# Patient Record
Sex: FEMALE | Race: WHITE | Employment: STUDENT | URBAN - METROPOLITAN AREA
[De-identification: names, ages, dates, MRNs, and addresses within clinical notes are randomized per-mention and may not be internally consistent; named-entity substitution may affect disease eponyms.]

---
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
## 2025-01-05 NOTE — PROGRESS NOTES
Called and spoke with mother. Explained that patients blood pressure was slightly elevated at visit on Monday. Dr. Brock would like patient to check BP at home. Mother confirmed she has a BP machine at home. Asked mother to do once a day for the next 2 days and let us know. Patient should be in a calm environment and sitting for 5 minutes prior to taking. Mother agrees. Also discussed with mother that Dr. Meyer was concerned in July about patients BP and family was instructed to do once daily BP for 2 weeks and follow up. Mother reports that they did not follow up with this. Explained the importance and encouraged mother to contact PCP for additional follow up. Mother agrees. Mother will also let clinic know Friday what BP's are at home.  Robina Dupont RN    
Carmen Brock MD Sigfrid-Fournier, Hilary, LUIZ Quarles,   This kid has had 2 recent measurements of very high BP.  I did not realize it today until she left clinic.  It looks like she may already have a BP machine at home.  Can you call mom and ask them to check her BP and let us know what it is at home?  If she does not have a machine at home, can you ask her to come in for a nurse visit when it is convenient?   Thank you    
See MyChart  Robina Dupont, RN    
Cardiac Monitor/Defib/ACLS/Rescue Kit/O2/BVM/oxygen/pulse ox

## 2025-01-07 ENCOUNTER — OFFICE VISIT (OUTPATIENT)
Dept: PSYCHIATRY | Facility: CLINIC | Age: 20
End: 2025-01-07
Payer: COMMERCIAL

## 2025-01-07 VITALS
HEIGHT: 70 IN | BODY MASS INDEX: 32.81 KG/M2 | WEIGHT: 229.2 LBS | SYSTOLIC BLOOD PRESSURE: 127 MMHG | DIASTOLIC BLOOD PRESSURE: 88 MMHG | HEART RATE: 103 BPM

## 2025-01-07 DIAGNOSIS — F41.0 GENERALIZED ANXIETY DISORDER WITH PANIC ATTACKS: Primary | ICD-10-CM

## 2025-01-07 DIAGNOSIS — F90.0 ATTENTION DEFICIT HYPERACTIVITY DISORDER, INATTENTIVE TYPE: ICD-10-CM

## 2025-01-07 DIAGNOSIS — F41.1 GENERALIZED ANXIETY DISORDER WITH PANIC ATTACKS: Primary | ICD-10-CM

## 2025-01-07 DIAGNOSIS — F41.0 PANIC ATTACK: ICD-10-CM

## 2025-01-07 RX ORDER — DEXMETHYLPHENIDATE HYDROCHLORIDE 5 MG/1
5 CAPSULE, EXTENDED RELEASE ORAL DAILY
Qty: 30 CAPSULE | Refills: 0 | Status: SHIPPED | OUTPATIENT
Start: 2025-01-07

## 2025-01-07 RX ORDER — METHYLPHENIDATE HYDROCHLORIDE 5 MG/1
5-10 TABLET ORAL DAILY PRN
Qty: 60 TABLET | Refills: 0 | Status: SHIPPED | OUTPATIENT
Start: 2025-01-07

## 2025-01-07 RX ORDER — METHYLPHENIDATE HYDROCHLORIDE 40 MG/1
40 CAPSULE, EXTENDED RELEASE ORAL EVERY MORNING
Qty: 30 CAPSULE | Refills: 0 | OUTPATIENT
Start: 2025-01-07

## 2025-01-07 RX ORDER — HYDROXYZINE PAMOATE 25 MG/1
25 CAPSULE ORAL 3 TIMES DAILY PRN
Qty: 30 CAPSULE | Refills: 1 | Status: SHIPPED | OUTPATIENT
Start: 2025-01-07

## 2025-01-07 RX ORDER — ESCITALOPRAM OXALATE 20 MG/1
20 TABLET ORAL DAILY
Qty: 90 TABLET | Refills: 0 | Status: SHIPPED | OUTPATIENT
Start: 2025-01-07

## 2025-01-07 NOTE — PROGRESS NOTES
"           [facility]   Progress Note         DATE: 2025      Identifying Information                                                                                                    ID: Olena Gilmore is a 19 year old female  : 2005  MRN: [id number]    Parents: ABE GILMOREREYES DANIEL  PCP: Abe Clark    Initial Evaluation in this clinic:  20    Chief Complaint                                                                                                        Medication Follow up    History of Present Illness                                                                                   Since the last visit:    Olena reports that she is dating a new lucio, Chandler, and it is going well. Reports that she has not had Ritalin LA due to supply/pharmacy issues for about two months. Completed  finals without Ritalin LA and \"it was rough,\" although finished with As and one B. Utilized methylphenidate IR dose some during this time. Mood is stable overall, noting that is has improved since breaking up with her boyfriend. Reports that anxiety is more manageable. Using THC periodically and notes that it helps with the \"emotional\" part of anxiety. SI/SIB is \"so much better.\" No SIB since at least September per Olena. Sleep is stable overall, but prefers to go to bed later and wake up later. Reports that she is scheduled for psychological testing in May through [facility].        Psychiatric & Medical Review of Systems                        A comprehensive review of systems was performed and is negative other than noted in the HPI.    Psychiatric:    ADHD:  has not had ritalin LA for at least 4-6 weeks  Depression: stable   Sleep: Stable  Anxiety:See HPI  PTSD: vivid dreams, past trauma  Panic: using hydroxyzine more frequently, and THC which she finds helpful  Psychosis: Denies  Leonela: None  Behavioral: None  ED: Not assessed today   Substance use: " periodic THC use. Denies other alcohol, nicotine or other substance use since last time.    SI/SIB: None   Other: Skin picking has improved      Medical & Surgical History       Patient Active Problem List   Diagnosis    Seasonal allergic rhinitis    Dry skin    Skin rash    Primary nocturnal enuresis    Nevus    Abdominal bloating    Class 1 obesity    Nocturnal enuresis    Adjustment disorder with anxious mood    Acanthosis nigricans    Ovarian failure    Primary amenorrhea    Constitutional tall stature (H)    Panic attack    Anxiety    Attention deficit hyperactivity disorder, inattentive type    Overweight    ASHLEY (obstructive sleep apnea)    Patellofemoral pain syndrome of both knees    Dyspareunia in female    Pelvic floor dysfunction    Generalized anxiety disorder with panic attacks    Morbid (severe) obesity due to excess calories (H)    Raynaud's disease without gangrene    Postural dizziness with presyncope    Fatigue, unspecified type    Bruising    Skin picking habit         Past Surgical History:   Procedure Laterality Date    ADENOIDECTOMY  8/8/68        Social & Family History                                                                               School: Started 2nd year at [employer]   Peer Relationships: Appropriate.     Family History   Problem Relation Age of Onset    Bipolar Disorder Mother         also schizoaffective disorder, under care of  nghia    Other - See Comments Maternal Uncle         Possible blood clots, Mom thinks it may have to do with running marathons? She is checking into this and will MyChart message        Allergy     Vancomycin    Current Medications     Current Outpatient Medications   Medication Sig Dispense Refill    azithromycin (ZITHROMAX) 250 MG tablet Two tablets first day, then one tablet daily for four days. 6 tablet 0    benzonatate (TESSALON) 100 MG capsule Take 1 capsule (100 mg) by mouth 3 times daily as needed for cough. 20 capsule 0    drospirenone-ethinyl  "estradiol (AGUSTIN) 3-0.02 MG tablet Take 1 tablet by mouth daily. 28 tablet 12    escitalopram (LEXAPRO) 20 MG tablet Take 1 tablet (20 mg) by mouth daily. 90 tablet 0    estradiol (ESTRACE) 2 MG tablet Take 1 tablet by mouth daily.      hydrOXYzine kp (VISTARIL) 25 MG capsule Take 1 capsule (25 mg) by mouth 3 times daily as needed for anxiety. 30 capsule 1    methylphenidate (RITALIN LA) 40 MG 24 hr capsule Take 1 capsule (40 mg) by mouth every morning. 30 capsule 0    methylphenidate (RITALIN LA) 40 MG 24 hr capsule Take 1 capsule (40 mg) by mouth every morning. 30 capsule 0    methylphenidate (RITALIN) 5 MG tablet Take 1-2 tablets (5-10 mg) by mouth daily as needed (ADHD) 60 tablet 0    naproxen sodium (ANAPROX) 220 MG tablet Take 3 tablets (660 mg) by mouth twice a day as needed (headache. Take with food. Limit to 4 days per week.).      prochlorperazine (COMPAZINE) 10 MG tablet Take 0.5-1 tablets (5-10 mg) by mouth every 8 hours as needed for nausea, vomiting or other (headache). 30 tablet 3    propranolol ER (INDERAL LA) 60 MG 24 hr capsule Take 1 capsule (60 mg) by mouth at bedtime 30 capsule 5    ubrogepant (UBRELVY) 100 MG tablet Take 1 tablet (100 mg) by mouth at onset of headache (migraine. May repeat in 2 hours as needed. Max 2 tabs in 24 hours). 16 tablet 11    vitamin B-2 (RIBOFLAVIN) 100 MG TABS tablet Take 2 tablets (200 mg) by mouth twice a day with breakfast and dinner.         Vitals                                                                                                              Last Vitals:  Vitals:    01/07/25 1603   BP: 127/88   BP Location: Right arm   Patient Position: Sitting   Cuff Size: Adult Regular   Pulse: 103   Weight: 104 kg (229 lb 3.2 oz)   Height: 1.784 m (5' 10.25\")         Wt Readings from Last 4 Encounters:   12/20/24 95.3 kg (210 lb) (98%, Z= 2.08)*   11/15/24 98.1 kg (216 lb 3.2 oz) (98%, Z= 2.16)*   11/08/24 102 kg (224 lb 14.4 oz) (99%, Z= 2.27)*   10/03/24 97.5 kg " "(215 lb) (98%, Z= 2.15)*     * Growth percentiles are based on [facility] (Girls, 2-20 Years) data.       Mental Status Exam                                                                                 Alertness: alert  and oriented  Appearance: casual, dressed appropriate to weather, earrings   Behavior/Demeanor: cooperative, pleasant, and calm, with good  eye contact   Speech: normal and regular rate and rhythm  Language: intact and no problems  Psychomotor: fidgety  Mood: \"good\"   Affect: full range and appropriate; was congruent to mood; was congruent to content  Thought Process/Associations: logical and linear and coherent  Thought Content: denies suicidal and violent ideation, no evidence of psychotic thought  Insight: good  Judgment: good  Cognition: grossly intact   Gait and Station: steady, symmetrical, even    Labs and Data     Recent Labs   Lab Test 11/15/24  1445 11/29/21  1644 07/08/21  1510   WBC 6.2   < > 5.5   HGB 12.4   < > 12.8   HCT 37.8   < > 39.8   MCV 88   < > 88      < > 279   ANEU  --   --  3.3    < > = values in this interval not displayed.     Recent Labs   Lab Test 11/15/24  1445 02/20/24  2129 11/18/23  1904      < > 140   POTASSIUM 4.2   < > 3.9   CHLORIDE 105   < > 107   CO2 25   < > 22   GLC 95   < > 109*   JULES 9.6   < > 9.7   MAG  --   --  2.3   BUN 8.3   < > 10.9   CR 0.78   < > 0.92   GFRESTIMATED >90   < > >90   ALBUMIN 4.4   < > 4.6   PROTTOTAL 7.8   < > 7.4   AST 22   < > 16   ALT 19   < > 19   ALKPHOS 66   < > 56   BILITOTAL 0.4   < > 0.3    < > = values in this interval not displayed.     Recent Labs   Lab Test 11/15/24  1445   CHOL 168      HDL 42*   TRIG 87   A1C 5.2     Recent Labs   Lab Test 11/15/24  1445   TSH 1.72   T4 1.18     Formulation                                       Olena is a 19 year old female with diagnoses of Anxiety, Panic and ADHD. Olena has not been able to obtain her Ritalin LA for over a month due to supply problems which has " contributed to some increased anxiety and functional challenges. Today discussed switching to Focalin XR given that Ritalin LA brand name has stopped manufacturing medications. Will plan to follow up in 1 month. Additionally, Olena has a range of physical health challenges in addition to her mental health challenges and an integrative consult may be helpful in providing her some tools and strategies for managing her symptoms and assess her from a more functional/holistic approach. Placed referral order to SHE Escobar at [facility].        Diagnoses & Plan                                                                                            Today we addressed the following problems:  Generalized anxiety disorder:  Continue escitalopram 20mg daily.   Continue propranolol 60mg ER per Neurologist for migraine prophylaxis   Continue therapy     Panic Disorder:   Continue escitalopram 20 mg   Continue hydroxyzine pamoate 25 mg up to three times daily as needed for panic.     Attention Deficit Hyperactivity Disorder, predominately inattentive type:  STOP Ritalin LA 40mg every morning.   START dexmethylphenidate XR 5 mg, increase by 5 mg every few days up to 15 mg daily.   CONTINUE methylphenidate IR 5-10mg in the afternoon as needed     Informed Consent  We discussed the risks and benefits of the medication(s) mentioned above, including precautions, drug interactions and/or potential side effects/adverse reactions. Specific precautions, interactions and side effects discussed included, but were not limited to: orthostatic hypotension, dizziness, GI upset.      The patient and/or guardian verbalized understanding of the risks and consented to treatment with the capacity to do so.  TREATMENT PLAN:    Therapy  Continue therapy as planned.    Other Recommendations  Move your body for at least 30 minutes each day  Eat a variety of foods including protein, fruits, and vegetables  Practice Deep Breathing 1-2 times  daily  Recommend using SAD lamp with 10,000 lux during the day for 20 -30 minutes    Resources  Safety plan reviewed. To the Emergency Department as needed or call after hours crisis line at [phone] or [phone].   National Suicide Prevention Lifeline: 578.954.5253 (TTY: 264.606.7707). Call anytime for help. (www.suicidepreventionlifeline.org)  Mental Health Association (www.mentalhealth.org): 812.735.9858 or 221-612-5109. [place] Crisis Text Line. Text [place] to 695792  Suicide LifeLine Chat: suicideAuthentix.org/chat  National Waka on Mental Illness (www.osmani.org): 220.303.7207 or 607-968-4932.   Gotcha Ninjashart may be used to communicate with your provider, but this is not intended to be used for emergencies.  Follow up with primary care provider as planned or for medical concerns.    Follow up  Schedule an appointment with me in 1 month or sooner as needed      Provider:   Inna Brownlee, DNP, APRN, PMHNP-BC  [facility]    Level of Medical Decision Making:   - At least 1 chronic problem that is not stable  - Engaged in prescription drug management during visit (discussed any medication benefits, side effects, alternatives, etc.)           The longitudinal plan of care for the condition(s) below were addressed during this visit. Due to the added complexity in care, I will continue to support Olena in the subsequent management of this condition(s) and with the ongoing continuity of care of this condition(s).    Problem List Items Addressed This Visit as of 1/7/2025      Panic attack    Attention deficit hyperactivity disorder, inattentive type    Generalized anxiety disorder with panic attacks - Primary

## 2025-01-07 NOTE — PATIENT INSTRUCTIONS
**For crisis resources, please see the information at the end of this document**   Patient Education    Thank you for coming to the [facility].    Lab Testing:  If you had lab testing today and your results are reassuring or normal they will be mailed to you or sent through MessageParty within 7 days. If the lab tests need quick action we will call you with the results. The phone number we will call with results is # [phone] (home) [phone] (work). If this is not the best number please call our clinic and change the number.    Medication Refills:  If you need any refills please call your pharmacy and they will contact us. Our fax number for refills is [phone]. Please allow three business for refill processing. If you need to  your refill at a new pharmacy, please contact the new pharmacy directly. The new pharmacy will help you get your medications transferred.     Scheduling:  If you have any concerns about today's visit or wish to schedule another appointment please call our office during normal business hours [phone] (8-5:00 M-F)    Contact Us:  Please call [phone] during business hours (8-5:00 M-F).  If after clinic hours, or on the weekend, please call  [phone].    Financial Assistance [phone]  [facility] Billing [phone]  Central Billing Office, [facility]: [phone]  [facility] Billing [phone]  Medical Records [phone]  [facility] Patient Bill of Rights [email or web address]        MENTAL HEALTH CRISIS RESOURCES:  For a emergency help, please call 911 or go to the nearest Emergency Department.      Children's Emergency Walk-In Options:   [facility]:  [address] [place] [address], [address]  [facility]:   [address], [address]  [place] - [address],  [place], [address]    Adult Emergency Walk-In Options:  [facility]:  [address], [place], [address]  [facility]:  [address] [place] [address]  [facility]:  [address], [place] [address]  [facility] :  [address] [place] [address]    [facility] Crisis Information:   [place] (ARINA) - Adult: [phone]       Child: [phone]  [place] - Adult: [phone]     Child: [phone]  [place]: [phone]  [place]: [phone]  [place]: [phone]    List of all [facility] resources:   https://[place].gov/dhs/people-we-serve/adults/health-care/mental-health/resources/crisis-contacts.jsp     National Crisis Information:   Call or text: '988'  National Suicide Prevention Lifeline: 0-096-101-TALK (1-233.295.6501) - for online chat options, visit https://suicidepreventionlifeline.org/chat/  Poison Control Center: 3-653-907-5413  Trans Lifeline: 1-356-407-8568 - Hotline for transgender people of all ages  The Luca Project: 5-447-468-2435 - Hotline for LGBT youth      For Non-Emergency Support:   Fast Tracker: Mental Health & Substance Use Disorder Resources -   https://www.fasttrackNeighbortree.comn.org/        Again thank you for choosing [facility] and please let us know how we can best partner with you to improve you and your family's health.    You may be receiving a survey regarding this appointment. We would love to have your feedback, both positive and negative. The survey is done by an external company, so your answers are anonymous.        **For crisis resources, please see the information at the end of this document**   Patient Education    Thank you for coming to the [facility].    Lab Testing:  If you had lab testing today and your results are reassuring or normal they will be mailed to you or sent through Niche within 7 days. If the lab tests need  quick action we will call you with the results. The phone number we will call with results is # [phone] (home) [phone] (work). If this is not the best number please call our clinic and change the number.    Medication Refills:  If you need any refills please call your pharmacy and they will contact us. Our fax number for refills is [phone]. Please allow three business for refill processing. If you need to  your refill at a new pharmacy, please contact the new pharmacy directly. The new pharmacy will help you get your medications transferred.     Scheduling:  If you have any concerns about today's visit or wish to schedule another appointment please call our office during normal business hours [phone] (8-5:00 M-F)    Contact Us:  Please call [phone] during business hours (8-5:00 M-F).  If after clinic hours, or on the weekend, please call  [phone].    Financial Assistance [phone]  [email or web address] Billing [phone]  Central Billing Office, [facility]: [phone]  [facility] Billing [phone]  Medical Records [phone]  [facility] Patient Bill of Rights [email or web address]        MENTAL HEALTH CRISIS RESOURCES:  For a emergency help, please call 911 or go to the nearest Emergency Department.      Children's Emergency Walk-In Options:   [facility]:  [address] [place], [place], [address]  [facility]:   [address], [address]  [place] - [address], [place], [address]    Adult Emergency Walk-In Options:  [facility]:  [address] [place], [address]  [facility]:  [address], [place] [address]  [facility]:  [address] [place] [address]  [facility] :  [address] [place] [address]    [facility]  Information:   Eligio GrantARINA) - Adult: [phone]       Child: [phone]  [place] - Adult: [phone]     Child: [phone]  [place]: [phone]  [place]: [phone]  [place]: [phone]    List of all [facility] resources:   https://[place].gov/dhs/people-we-serve/adults/health-care/mental-health/resources/crisis-contacts.jsp     National Crisis Information:   Call or text: '988'  National Suicide Prevention Lifeline: 7-496-806-TALK (1-169.968.1011) - for online chat options, visit https://suicidepreventionlifeline.org/chat/  Poison Control Center: 7-876-484-9857  Trans Lifeline: 1-504.180.9338 - Hotline for transgender people of all ages  The Luca Project: 1-723.524.8641 - Hotline for LGBT youth      For Non-Emergency Support:   Fast Tracker: Mental Health & Substance Use Disorder Resources -   https://www.fasttrackermn.org/        Again thank you for choosing [facility] and please let us know how we can best partner with you to improve you and your family's health.    You may be receiving a survey regarding this appointment. We would love to have your feedback, both positive and negative. The survey is done by an external company, so your answers are anonymous.

## 2025-01-07 NOTE — NURSING NOTE
"Chief Complaint   Patient presents with    Eval/Assessment       /88 (BP Location: Right arm, Patient Position: Sitting, Cuff Size: Adult Regular)   Pulse 103   Ht 5' 10.25\" (178.4 cm)   Wt 229 lb 3.2 oz (104 kg)   BMI 32.65 kg/m      Michael Adler  January 7, 2025   "

## 2025-01-07 NOTE — TELEPHONE ENCOUNTER
Inna Brownlee, Chaya Barnett, RN  Phone Number: [phone]     OK to send!        Follow up:  Letter written and sent to patient via [employer].     Ritalin La 40 Mg Capsule last filled 10/21 per .

## 2025-01-20 ENCOUNTER — HOSPITAL ENCOUNTER (EMERGENCY)
Facility: CLINIC | Age: 20
Discharge: HOME | End: 2025-01-20
Attending: EMERGENCY MEDICINE | Admitting: EMERGENCY MEDICINE
Payer: COMMERCIAL

## 2025-01-20 VITALS
TEMPERATURE: 98.5 F | RESPIRATION RATE: 16 BRPM | SYSTOLIC BLOOD PRESSURE: 137 MMHG | DIASTOLIC BLOOD PRESSURE: 90 MMHG | OXYGEN SATURATION: 97 % | HEART RATE: 89 BPM

## 2025-01-20 DIAGNOSIS — R11.0 NAUSEA: ICD-10-CM

## 2025-01-20 DIAGNOSIS — R42 DIZZINESS: ICD-10-CM

## 2025-01-20 LAB
ALBUMIN SERPL BCG-MCNC: 4.8 G/DL (ref 3.5–5.2)
ALBUMIN UR-MCNC: 50 MG/DL
ALP SERPL-CCNC: 70 U/L (ref 40–150)
ALT SERPL W P-5'-P-CCNC: 15 U/L (ref 0–50)
ANION GAP SERPL CALCULATED.3IONS-SCNC: 14 MMOL/L (ref 7–15)
APPEARANCE UR: ABNORMAL
AST SERPL W P-5'-P-CCNC: 21 U/L (ref 0–35)
BASOPHILS # BLD AUTO: 0.1 10E3/UL (ref 0–0.2)
BASOPHILS NFR BLD AUTO: 1 %
BILIRUB SERPL-MCNC: 0.7 MG/DL
BILIRUB UR QL STRIP: NEGATIVE
BUN SERPL-MCNC: 14.4 MG/DL (ref 6–20)
CALCIUM SERPL-MCNC: 10.1 MG/DL (ref 8.8–10.4)
CHLORIDE SERPL-SCNC: 106 MMOL/L (ref 98–107)
COLOR UR AUTO: ABNORMAL
CREAT SERPL-MCNC: 0.84 MG/DL (ref 0.51–0.95)
EGFRCR SERPLBLD CKD-EPI 2021: >90 ML/MIN/1.73M2
EOSINOPHIL # BLD AUTO: 0 10E3/UL (ref 0–0.7)
EOSINOPHIL NFR BLD AUTO: 1 %
ERYTHROCYTE [DISTWIDTH] IN BLOOD BY AUTOMATED COUNT: 13.1 % (ref 10–15)
GLUCOSE SERPL-MCNC: 84 MG/DL (ref 70–99)
GLUCOSE UR STRIP-MCNC: NEGATIVE MG/DL
HCG UR QL: NEGATIVE
HCO3 SERPL-SCNC: 20 MMOL/L (ref 22–29)
HCT VFR BLD AUTO: 40.7 % (ref 35–47)
HGB BLD-MCNC: 13.2 G/DL (ref 11.7–15.7)
HGB UR QL STRIP: NEGATIVE
IMM GRANULOCYTES # BLD: 0 10E3/UL
IMM GRANULOCYTES NFR BLD: 0 %
INTERNAL QC OK POCT: NORMAL
KETONES UR STRIP-MCNC: ABNORMAL MG/DL
LEUKOCYTE ESTERASE UR QL STRIP: ABNORMAL
LYMPHOCYTES # BLD AUTO: 1.9 10E3/UL (ref 0.8–5.3)
LYMPHOCYTES NFR BLD AUTO: 26 %
MAGNESIUM SERPL-MCNC: 2.3 MG/DL (ref 1.7–2.3)
MCH RBC QN AUTO: 28.1 PG (ref 26.5–33)
MCHC RBC AUTO-ENTMCNC: 32.4 G/DL (ref 31.5–36.5)
MCV RBC AUTO: 87 FL (ref 78–100)
MONOCYTES # BLD AUTO: 0.5 10E3/UL (ref 0–1.3)
MONOCYTES NFR BLD AUTO: 7 %
MUCOUS THREADS #/AREA URNS LPF: PRESENT /LPF
NEUTROPHILS # BLD AUTO: 4.8 10E3/UL (ref 1.6–8.3)
NEUTROPHILS NFR BLD AUTO: 66 %
NITRATE UR QL: NEGATIVE
NRBC # BLD AUTO: 0 10E3/UL
NRBC BLD AUTO-RTO: 0 /100
PH UR STRIP: 6.5 [PH] (ref 5–7)
PLATELET # BLD AUTO: 286 10E3/UL (ref 150–450)
POCT KIT EXPIRATION DATE: NORMAL
POCT KIT LOT NUMBER: NORMAL
POTASSIUM SERPL-SCNC: 4.1 MMOL/L (ref 3.4–5.3)
PROT SERPL-MCNC: 7.8 G/DL (ref 6.4–8.3)
RBC # BLD AUTO: 4.69 10E6/UL (ref 3.8–5.2)
RBC URINE: 2 /HPF
SODIUM SERPL-SCNC: 140 MMOL/L (ref 135–145)
SP GR UR STRIP: 1.03 (ref 1–1.03)
SQUAMOUS EPITHELIAL: 17 /HPF
TRANSITIONAL EPI: <1 /HPF
TSH SERPL DL<=0.005 MIU/L-ACNC: 2.33 UIU/ML (ref 0.5–4.3)
UROBILINOGEN UR STRIP-MCNC: 3 MG/DL
WBC # BLD AUTO: 7.2 10E3/UL (ref 4–11)
WBC URINE: 5 /HPF

## 2025-01-20 PROCEDURE — 84443 ASSAY THYROID STIM HORMONE: CPT | Performed by: EMERGENCY MEDICINE

## 2025-01-20 PROCEDURE — 84450 TRANSFERASE (AST) (SGOT): CPT | Performed by: EMERGENCY MEDICINE

## 2025-01-20 PROCEDURE — 99284 EMERGENCY DEPT VISIT MOD MDM: CPT | Mod: 25 | Performed by: EMERGENCY MEDICINE

## 2025-01-20 PROCEDURE — 99284 EMERGENCY DEPT VISIT MOD MDM: CPT | Performed by: EMERGENCY MEDICINE

## 2025-01-20 PROCEDURE — 81001 URINALYSIS AUTO W/SCOPE: CPT | Performed by: STUDENT IN AN ORGANIZED HEALTH CARE EDUCATION/TRAINING PROGRAM

## 2025-01-20 PROCEDURE — 258N000003 HC RX IP 258 OP 636: Performed by: EMERGENCY MEDICINE

## 2025-01-20 PROCEDURE — 80048 BASIC METABOLIC PNL TOTAL CA: CPT | Performed by: EMERGENCY MEDICINE

## 2025-01-20 PROCEDURE — 96375 TX/PRO/DX INJ NEW DRUG ADDON: CPT | Performed by: EMERGENCY MEDICINE

## 2025-01-20 PROCEDURE — 85004 AUTOMATED DIFF WBC COUNT: CPT | Performed by: EMERGENCY MEDICINE

## 2025-01-20 PROCEDURE — 85048 AUTOMATED LEUKOCYTE COUNT: CPT | Performed by: EMERGENCY MEDICINE

## 2025-01-20 PROCEDURE — 96361 HYDRATE IV INFUSION ADD-ON: CPT | Performed by: EMERGENCY MEDICINE

## 2025-01-20 PROCEDURE — 36415 COLL VENOUS BLD VENIPUNCTURE: CPT | Performed by: EMERGENCY MEDICINE

## 2025-01-20 PROCEDURE — 96374 THER/PROPH/DIAG INJ IV PUSH: CPT | Performed by: EMERGENCY MEDICINE

## 2025-01-20 PROCEDURE — 83735 ASSAY OF MAGNESIUM: CPT | Performed by: EMERGENCY MEDICINE

## 2025-01-20 PROCEDURE — 250N000011 HC RX IP 250 OP 636: Performed by: EMERGENCY MEDICINE

## 2025-01-20 RX ORDER — METOCLOPRAMIDE HYDROCHLORIDE 5 MG/ML
5 INJECTION INTRAMUSCULAR; INTRAVENOUS ONCE
Status: COMPLETED | OUTPATIENT
Start: 2025-01-20 | End: 2025-01-20

## 2025-01-20 RX ORDER — KETOROLAC TROMETHAMINE 15 MG/ML
15 INJECTION, SOLUTION INTRAMUSCULAR; INTRAVENOUS ONCE
Status: COMPLETED | OUTPATIENT
Start: 2025-01-20 | End: 2025-01-20

## 2025-01-20 RX ORDER — DIPHENHYDRAMINE HYDROCHLORIDE 50 MG/ML
25 INJECTION INTRAMUSCULAR; INTRAVENOUS ONCE
Status: COMPLETED | OUTPATIENT
Start: 2025-01-20 | End: 2025-01-20

## 2025-01-20 RX ORDER — ONDANSETRON 4 MG/1
4 TABLET, ORALLY DISINTEGRATING ORAL EVERY 8 HOURS PRN
Qty: 10 TABLET | Refills: 0 | Status: SHIPPED | OUTPATIENT
Start: 2025-01-20 | End: 2025-01-23

## 2025-01-20 RX ADMIN — KETOROLAC TROMETHAMINE 15 MG: 15 INJECTION, SOLUTION INTRAMUSCULAR; INTRAVENOUS at 20:26

## 2025-01-20 RX ADMIN — SODIUM CHLORIDE 1000 ML: 9 INJECTION, SOLUTION INTRAVENOUS at 20:23

## 2025-01-20 RX ADMIN — METOCLOPRAMIDE 5 MG: 5 INJECTION, SOLUTION INTRAMUSCULAR; INTRAVENOUS at 20:23

## 2025-01-20 ASSESSMENT — ACTIVITIES OF DAILY LIVING (ADL)
ADLS_ACUITY_SCORE: 41

## 2025-01-20 NOTE — ED PROVIDER NOTES
[place] [facility]ERGENCY DE[facility]MENT ([facility])    1/20/25       ED PROVIDER NOTE       History     Chief Complaint   Patient presents with    Dizziness    Nausea     HPI  Olena Gilmore is a 19 year old female with a past medical history of JULIA, primary ovarian failure, migraines who presents to the Emergency Department with dizziness and nausea. The patient states that she he has had migraine aura and nausea for the past week. She notes that she has had migraine aura before but it has not lasted this long. In the past she has used over the counter remedies. She does not know the cause of her migraine aura.she states she feels like she is going to pass out. She gets nauseous every time she eats. She also notes that she has had muscle weakness for the past week. Last night she developed muscle spasm. No other symptoms noted.    Past Medical History  Past Medical History:   Diagnosis Date    GERD (gastroesophageal reflux disease)     Obesity, pediatric, BMI 85th to less than 95th percentile for age 9/18/2017     Past Surgical History:   Procedure Laterality Date    ADENOIDECTOMY  8/8/68     azithromycin (ZITHROMAX) 250 MG tablet  benzonatate (TESSALON) 100 MG capsule  dexmethylphenidate (FOCALIN XR) 5 MG 24 hr capsule  drospirenone-ethinyl estradiol (AGUSTIN) 3-0.02 MG tablet  escitalopram (LEXAPRO) 20 MG tablet  estradiol (ESTRACE) 2 MG tablet  hydrOXYzine kp (VISTARIL) 25 MG capsule  methylphenidate (RITALIN) 5 MG tablet  naproxen sodium (ANAPROX) 220 MG tablet  prochlorperazine (COMPAZINE) 10 MG tablet  propranolol ER (INDERAL LA) 60 MG 24 hr capsule  ubrogepant (UBRELVY) 100 MG tablet  vitamin B-2 (RIBOFLAVIN) 100 MG TABS tablet      Allergies   Allergen Reactions    Vancomycin Headache, Itching, Swelling and Unknown     Family History  Family History   Problem Relation Age of Onset    Bipolar Disorder Mother         also schizoaffective disorder, under care of  nghia    Other - See Comments Maternal Uncle          Possible blood clots, Mom thinks it may have to do with running marathons? She is checking into this and will MyChart message     Social History   Social History     Tobacco Use    Smoking status: Never     Passive exposure: Never    Smokeless tobacco: Never    Tobacco comments:     No second hand smoke exposure.    Vaping Use    Vaping status: Never Used   Substance Use Topics    Alcohol use: No    Drug use: No      A medically appropriate review of systems was performed with pertinent positives and negatives noted in the HPI, and all other systems negative.    Physical Exam   BP: 119/84  Pulse: 82  Temp: 98.5  F (36.9  C)  Resp: 20  SpO2: 98 %  Physical Exam  Vitals and nursing note reviewed.   Constitutional:       General: She is not in acute distress.     Appearance: She is well-developed. She is not diaphoretic.   HENT:      Head: Normocephalic and atraumatic.      Mouth/Throat:      Pharynx: No oropharyngeal exudate.   Eyes:      General: No scleral icterus.        Right eye: No discharge.         Left eye: No discharge.      Pupils: Pupils are equal, round, and reactive to light.   Cardiovascular:      Rate and Rhythm: Normal rate and regular rhythm.      Heart sounds: Normal heart sounds. No murmur heard.     No friction rub. No gallop.   Pulmonary:      Effort: Pulmonary effort is normal. No respiratory distress.      Breath sounds: Normal breath sounds. No wheezing.   Chest:      Chest wall: No tenderness.   Abdominal:      General: Bowel sounds are normal. There is no distension.      Palpations: Abdomen is soft.      Tenderness: There is no abdominal tenderness.   Musculoskeletal:         General: No tenderness or deformity. Normal range of motion.      Cervical back: Normal range of motion and neck supple.   Skin:     General: Skin is warm and dry.      Coloration: Skin is not pale.      Findings: No erythema or rash.   Neurological:      Mental Status: She is alert and oriented to person,  place, and time.      Cranial Nerves: No cranial nerve deficit.           ED Course, Procedures, & Data      Procedures                Results for orders placed or performed during the hospital encounter of 01/20/25   hCG qual urine POCT     Status: Normal   Result Value Ref Range    HCG Qual Urine Negative Negative    Internal QC Check POCT Valid Valid    POCT Kit Lot Number [id number]     POCT Kit Expiration Date 2026-05-04      Medications   sodium chloride 0.9% BOLUS 1,000 mL (has no administration in time range)   metoclopramide (REGLAN) injection 5 mg (has no administration in time range)   ketorolac (TORADOL) injection 15 mg (has no administration in time range)   diphenhydrAMINE (BENADRYL) injection 25 mg (has no administration in time range)     Labs Ordered and Resulted from Time of ED Arrival to Time of ED Departure   HCG QUALITATIVE URINE POCT - Normal       Result Value    HCG Qual Urine Negative      Internal QC Check POCT Valid      POCT Kit Lot Number [id number]      POCT Kit Expiration Date 2026-05-04     ROUTINE UA WITH MICROSCOPIC REFLEX TO CULTURE   COMPREHENSIVE METABOLIC PANEL   MAGNESIUM   TSH WITH FREE T4 REFLEX     No orders to display              Assessment & Plan    Is a 19-year-old female who presents with nausea and dizziness.  She states this is similar to previous migraine auras but longer lasting.  No known precipitating factors.  She also notes feeling of muscle weakness and muscle cramping.  Exam demonstrates no acute abnormalities.  Lab work shows no acute abnormalities.  Patient was given IV fluids, ketorolac, and metoclopramide.  She had resolution of symptoms.  I discussed all results with patient.  Will provide prescription for ondansetron if needed.  Will discharge with return precautions.    I have reviewed the nursing notes. I have reviewed the findings, diagnosis, plan and need for follow up with the patient.    New Prescriptions    No medications on file       Final diagnoses:    None   I, Roxann Sosa, am serving as a trained medical scribe to document services personally performed by Deep Crespo DO, based on the provider's statements to me.     I, Deep Crespo DO, was physically present and have reviewed and verified the accuracy of this note documented by Roxann Sosa.     Deep Crespo DO  [facility] EMERGENCY DEPARTMENT  1/20/2025     Deep Crespo DO  01/20/25 4590

## 2025-01-21 ENCOUNTER — PATIENT OUTREACH (OUTPATIENT)
Dept: FAMILY MEDICINE | Facility: CLINIC | Age: 20
End: 2025-01-21
Payer: COMMERCIAL

## 2025-01-21 NOTE — TELEPHONE ENCOUNTER
Needs TCM outreach    Patient Class: Emergency    Diagnosis: Dizziness    Nausea      Cathie Guzman RN

## 2025-01-21 NOTE — TELEPHONE ENCOUNTER
First attempt. Attempted to reach pt to complete hospital follow-up call. There was no answer. Left message to return call to a nurse at [phone].    If pt returns call please complete the following:    Please complete Post Discharge Assessment questionnaire found in Screenings tab.      2.  After completing Post Discharge Assessment questionnaire, please enter the following dot phrase and to complete note (.rnhospedoutreach):      Neo White RN, BSN

## 2025-01-21 NOTE — ED TRIAGE NOTES
Pt ambulatory to triage with CC of dizziness and nausea. Reports this feels like a migraine aura and feels as though she may pass out. This episode has lasted 1 week. Pt reports treating symptoms with water and Liquid IV electrolyte solutions. Has been feeling more nauseaus after eating and seeing spots     Triage Assessment (Adult)       Row Name 01/20/25 1846          Triage Assessment    Airway WDL WDL        Respiratory WDL    Respiratory WDL WDL        Skin Circulation/Temperature WDL    Skin Circulation/Temperature WDL WDL        Cardiac WDL    Cardiac WDL WDL        Peripheral/Neurovascular WDL    Peripheral Neurovascular WDL WDL        Cognitive/Neuro/Behavioral WDL    Cognitive/Neuro/Behavioral WDL X  dizzy        Lex Coma Scale    Best Eye Response 4-->(E4) spontaneous     Best Motor Response 6-->(M6) obeys commands     Best Verbal Response 5-->(V5) oriented     Robards Coma Scale Score 15

## 2025-01-22 NOTE — TELEPHONE ENCOUNTER
Second attempt. Attempted to reach pt to complete hospital follow-up call. There was no answer. Left message to return call to a nurse at [phone].    If pt returns call please complete the following:    Please complete Post Discharge Assessment questionnaire found in Screenings tab.      2.  After completing Post Discharge Assessment questionnaire, please enter the following dot phrase and to complete note (.rnhospedoutreach):       Neo White RN, BSN

## 2025-01-23 ENCOUNTER — ANCILLARY PROCEDURE (OUTPATIENT)
Dept: GENERAL RADIOLOGY | Facility: CLINIC | Age: 20
End: 2025-01-23
Attending: PEDIATRICS
Payer: COMMERCIAL

## 2025-01-23 ENCOUNTER — OFFICE VISIT (OUTPATIENT)
Dept: FAMILY MEDICINE | Facility: CLINIC | Age: 20
End: 2025-01-23
Payer: COMMERCIAL

## 2025-01-23 ENCOUNTER — ANCILLARY PROCEDURE (OUTPATIENT)
Dept: CT IMAGING | Facility: CLINIC | Age: 20
End: 2025-01-23
Attending: PEDIATRICS
Payer: COMMERCIAL

## 2025-01-23 VITALS
TEMPERATURE: 97.6 F | BODY MASS INDEX: 31.78 KG/M2 | HEART RATE: 89 BPM | OXYGEN SATURATION: 99 % | DIASTOLIC BLOOD PRESSURE: 78 MMHG | SYSTOLIC BLOOD PRESSURE: 111 MMHG | HEIGHT: 71 IN | RESPIRATION RATE: 16 BRPM | WEIGHT: 227 LBS

## 2025-01-23 DIAGNOSIS — R11.2 NAUSEA AND VOMITING, UNSPECIFIED VOMITING TYPE: ICD-10-CM

## 2025-01-23 DIAGNOSIS — R11.2 NAUSEA AND VOMITING, UNSPECIFIED VOMITING TYPE: Primary | ICD-10-CM

## 2025-01-23 DIAGNOSIS — R10.13 ABDOMINAL PAIN, EPIGASTRIC: ICD-10-CM

## 2025-01-23 DIAGNOSIS — F33.1 MODERATE EPISODE OF RECURRENT MAJOR DEPRESSIVE DISORDER (H): ICD-10-CM

## 2025-01-23 DIAGNOSIS — S30.851A METAL FOREIGN BODY IN ABDOMEN: ICD-10-CM

## 2025-01-23 DIAGNOSIS — H92.12 OTORRHEA, LEFT: ICD-10-CM

## 2025-01-23 DIAGNOSIS — E66.01 MORBID (SEVERE) OBESITY DUE TO EXCESS CALORIES (H): ICD-10-CM

## 2025-01-23 LAB
ALBUMIN SERPL BCG-MCNC: 4.6 G/DL (ref 3.5–5.2)
ALP SERPL-CCNC: 67 U/L (ref 40–150)
ALT SERPL W P-5'-P-CCNC: 17 U/L (ref 0–50)
ANION GAP SERPL CALCULATED.3IONS-SCNC: 9 MMOL/L (ref 7–15)
AST SERPL W P-5'-P-CCNC: 19 U/L (ref 0–35)
BILIRUB SERPL-MCNC: 0.6 MG/DL
BUN SERPL-MCNC: 14.8 MG/DL (ref 6–20)
C TRACH DNA SPEC QL NAA+PROBE: NEGATIVE
CALCIUM SERPL-MCNC: 10.1 MG/DL (ref 8.8–10.4)
CHLORIDE SERPL-SCNC: 106 MMOL/L (ref 98–107)
CREAT SERPL-MCNC: 1 MG/DL (ref 0.51–0.95)
EGFRCR SERPLBLD CKD-EPI 2021: 83 ML/MIN/1.73M2
GLUCOSE SERPL-MCNC: 94 MG/DL (ref 70–99)
HCO3 SERPL-SCNC: 25 MMOL/L (ref 22–29)
LIPASE SERPL-CCNC: 24 U/L (ref 13–60)
MONOCYTES NFR BLD AUTO: NEGATIVE %
N GONORRHOEA DNA SPEC QL NAA+PROBE: NEGATIVE
POTASSIUM SERPL-SCNC: 4.1 MMOL/L (ref 3.4–5.3)
PROT SERPL-MCNC: 7.4 G/DL (ref 6.4–8.3)
SODIUM SERPL-SCNC: 140 MMOL/L (ref 135–145)
SPECIMEN TYPE: NORMAL
SPECIMEN TYPE: NORMAL

## 2025-01-23 PROCEDURE — 74177 CT ABD & PELVIS W/CONTRAST: CPT | Performed by: RADIOLOGY

## 2025-01-23 RX ORDER — IOPAMIDOL 755 MG/ML
111 INJECTION, SOLUTION INTRAVASCULAR ONCE
Status: COMPLETED | OUTPATIENT
Start: 2025-01-23 | End: 2025-01-23

## 2025-01-23 RX ORDER — OFLOXACIN 3 MG/ML
5 SOLUTION AURICULAR (OTIC) 2 TIMES DAILY
Qty: 10 ML | Refills: 0 | Status: SHIPPED | OUTPATIENT
Start: 2025-01-23

## 2025-01-23 RX ADMIN — IOPAMIDOL 111 ML: 755 INJECTION, SOLUTION INTRAVASCULAR at 14:24

## 2025-01-23 ASSESSMENT — PAIN SCALES - GENERAL: PAINLEVEL_OUTOF10: NO PAIN (0)

## 2025-01-23 NOTE — PROGRESS NOTES
"  Assessment & Plan     Nausea and vomiting, unspecified vomiting type  DDx includes viral gastroenteritis, ear infection, migraine, constipation, etc.    Patient Instructions   -  the anti nausea prescription and start using it  - Try the migraine medication  - Drink lots of liquids with electrolytes such as Gatorade  - Follow-up if anything worsens or if not improving in 2-3 days  - Follow-up in 1 month to recheck left ear  - NEISSERIA GONORRHOEA PCR; Future  - CHLAMYDIA TRACHOMATIS PCR; Future  - XR Abdomen 2 Views; Future  - ofloxacin (FLOXIN) 0.3 % otic solution; Place 5 drops Into the left ear 2 times daily.  - Comprehensive metabolic panel (BMP + Alb, Alk Phos, ALT, AST, Total. Bili, TP); Future  - Mononucleosis screen; Future  - Lipase; Future  - NEISSERIA GONORRHOEA PCR  - CHLAMYDIA TRACHOMATIS PCR  - Comprehensive metabolic panel (BMP + Alb, Alk Phos, ALT, AST, Total. Bili, TP)  - Mononucleosis screen  - Lipase    Moderate episode of recurrent major depressive disorder (H)  Managed by psychiatry    Morbid (severe) obesity due to excess calories (H)  Not addressed    Otorrhea, left  Follow-up in 1 month  - ofloxacin (FLOXIN) 0.3 % otic solution; Place 5 drops Into the left ear 2 times daily.          BMI  Estimated body mass index is 31.66 kg/m  as calculated from the following:    Height as of this encounter: 1.803 m (5' 11\").    Weight as of this encounter: 103 kg (227 lb).             Jaquan Hyatt is a 19 year old, presenting for the following health issues:  Nausea & Vomiting        1/23/2025     9:08 AM   Additional Questions   Roomed by benjamin     History of Present Illness       Reason for visit:  Nausea, vomiting, dizziness  Symptom onset:  3-7 days ago   She is taking medications regularly.         Acute Illness  Acute illness concerns: nausea vomiting   Onset/Duration: 1 week ago   Symptoms:  Fever: No  Chills/Sweats: No  Headache (location?): YES visual aura, headache on and " "off  Sinus Pressure: No  Conjunctivitis:  No  Ear Pain: YES- left, also sides of neck   Rhinorrhea: No  Congestion: No  Sore Throat: No  Cough: no  Wheeze: no   Decreased Appetite: YES  Nausea: YES  Vomiting: YES  Diarrhea: No  Dysuria/Freq.: No  Dysuria or Hematuria: No  Fatigue/Achiness: YES  Sick/Strep Exposure: No  Therapies tried and outcome: None    Patient was seen in ED 3 days ago for dizziness and nausea.  The following labs were unremarkable; urinalysis, UPT, CMP, CBC, TSH, T4, Mg.  She was given IVF, Metoclopramide and Ketorolac and her symptoms improved but have not resolved.  She was prescribed Zofran but has not yet picked it up.    She has now had 1 week of nausea and two episodes of NBNB emesis yesterday.  She feels dizzy, is seeing spots, and had an episode of double vision yesterday.  No travel, no sick contacts.  This doesn't feel like a migraine.  Occasionally uses marijuana.  No vaginal symptoms.  No dysuria.  Having normal BMs.  Recently started Focalin but has tolerated in the past without issues.  Does have L ear pain, a slight headache          Review of Systems  Constitutional, HEENT, cardiovascular, pulmonary, gi and gu systems are negative, except as otherwise noted.      Objective    /78   Pulse 89   Temp 97.6  F (36.4  C) (Temporal)   Resp 16   Ht 1.803 m (5' 11\")   Wt 103 kg (227 lb)   SpO2 99%   BMI 31.66 kg/m    Body mass index is 31.66 kg/m .  Physical Exam   GENERAL: alert and no distress  EYES: Eyes grossly normal to inspection, PERRL and conjunctivae and sclerae normal  HENT: normal cephalic/atraumatic, right ear: normal: no effusions, no erythema, normal landmarks, left ear: purulent drainage in canal, nose and mouth without ulcers or lesions, oropharynx clear, and oral mucous membranes moist  NECK: no adenopathy, no asymmetry, masses, or scars, no nuchal rigidity   RESP: lungs clear to auscultation - no rales, rhonchi or wheezes  CV: regular rate and rhythm, normal " S1 S2, no S3 or S4, no murmur, click or rub, no peripheral edema  ABDOMEN: soft, mildly tender in RLQ, no hepatosplenomegaly, no masses and bowel sounds normal  MS: no gross musculoskeletal defects noted, no edema          The longitudinal plan of care for the diagnosis(es)/condition(s) as documented were addressed during this visit. Due to the added complexity in care, I will continue to support Olena in the subsequent management and with ongoing continuity of care.    Signed Electronically by: Lyndsey Meyer MD

## 2025-01-23 NOTE — PATIENT INSTRUCTIONS
-  the anti nausea prescription and start using it  - Try the migraine medication  - Drink lots of liquids with electrolytes such as Gatorade  - Follow-up if anything worsens or if not improving in 2-3 days  - Follow-up in 1 month to recheck left ear    At [facility], we strive to deliver an exceptional experience to you, every time we see you. If you receive a survey, please let us know what we are doing well and/or what we could improve upon, as we do value your feedback.  If you have MyChart, you can expect to receive results automatically within 24 hours of their completion.  Your provider will send a note interpreting your results as well.   If you do not have MyChart, you should receive your results in about a week by mail.    Your care team:                            Family Medicine Internal Medicine   MD Vladimir Moser, MD Cayetano De León, MD Claudette Zavala, JAYDEN Gregory, MD Pediatrics   Georgina Blackman, MD Denia Murphy, MD Ashley Mondragon, APRN CNP Louise Cardona APRN CNP   Oleg Ratliff, MD Lyndsey Meyer, MD Starr Srivastava, CNP     Rakan Longoria, CNP Same-Day Provider (No follow-up visits)   MARYANN Dolan, DNP JAYDEN Martinez APRN, FNP, BC INGA DunnC     Clinic hours: Monday - Thursday 7 am-6 pm; Fridays 7 am-5 pm.   Urgent care: Monday - Friday 10 am- 8 pm; Saturday and Sunday 9 am-5 pm.    Clinic: [phone]       [facility]: Monday - Thursday 8 am - 7 pm; Friday 8 am - 6 pm  [facility]: [phone]

## 2025-01-23 NOTE — DISCHARGE INSTRUCTIONS

## 2025-01-23 NOTE — LETTER
January 23, 2025      Olena Gilmore  [address] [address] [address]        To Whom It May Concern:    Olena Gilmore  was seen on 1/23/2025.  Please excuse her  until she is feeling better due to illness.        Sincerely,        Lyndsey Meyer MD    Electronically signed

## 2025-01-23 NOTE — RESULT ENCOUNTER NOTE
Dear Olena Gilmore,    Your xray was basically lizbeth except there were some small, rice grain sized radiopaque objects on your abdomen.  Were there any jewels on your clothing in that area?  Was there anything metallic that you might have ingested?  Send me a HOSTING message.    Please don't hesitate to call me or send a message if you have any questions.    Sincerely,  Lyndsey Meyer M.D.  [phone]

## 2025-01-24 NOTE — RESULT ENCOUNTER NOTE
Dear Olena,    The CT scan is normal except for some small metallic densities in the colon that you most likely ingested.  These should not cause any harm and should pass on their own, but I want to get an xray next week to make sure they are gone.  Please call to schedule an xray appt.    Let me know if you have any questions!    Electronically signed by:  Lyndsey Meyer MD

## 2025-01-25 ENCOUNTER — MYC MEDICAL ADVICE (OUTPATIENT)
Dept: PSYCHIATRY | Facility: CLINIC | Age: 20
End: 2025-01-25
Payer: COMMERCIAL

## 2025-01-27 NOTE — TELEPHONE ENCOUNTER
Per most recent visit note:  START dexmethylphenidate XR 5 mg, increase by 5 mg every few days up to 15 mg daily.     Per script sent to pharmacy:  dexmethylphenidate (FOCALIN XR) 5 MG 24 hr capsule 30 capsule 0 1/7/2025 -- No   Sig - Route: Take 1 capsule (5 mg) by mouth daily. - Oral

## 2025-01-31 ENCOUNTER — ANCILLARY PROCEDURE (OUTPATIENT)
Dept: GENERAL RADIOLOGY | Facility: CLINIC | Age: 20
End: 2025-01-31
Attending: PEDIATRICS
Payer: COMMERCIAL

## 2025-01-31 DIAGNOSIS — S30.851A METAL FOREIGN BODY IN ABDOMEN: ICD-10-CM

## 2025-01-31 PROCEDURE — 74019 RADEX ABDOMEN 2 VIEWS: CPT | Mod: TC | Performed by: RADIOLOGY

## 2025-01-31 NOTE — RESULT ENCOUNTER NOTE
Dear Olena Gilmore,    The radio-opaque objets are now gone!  Please don't hesitate to call me or send a message if you have any questions.    Sincerely,  Lyndsey Meyer M.D.  [phone]

## 2025-02-20 ENCOUNTER — OFFICE VISIT (OUTPATIENT)
Dept: PSYCHIATRY | Facility: CLINIC | Age: 20
End: 2025-02-20
Payer: COMMERCIAL

## 2025-02-20 VITALS
WEIGHT: 233.1 LBS | SYSTOLIC BLOOD PRESSURE: 137 MMHG | HEIGHT: 71 IN | DIASTOLIC BLOOD PRESSURE: 82 MMHG | BODY MASS INDEX: 32.63 KG/M2 | HEART RATE: 87 BPM

## 2025-02-20 DIAGNOSIS — F90.0 ATTENTION DEFICIT HYPERACTIVITY DISORDER, INATTENTIVE TYPE: ICD-10-CM

## 2025-02-20 DIAGNOSIS — F41.0 PANIC ATTACK: ICD-10-CM

## 2025-02-20 RX ORDER — HYDROXYZINE PAMOATE 25 MG/1
25 CAPSULE ORAL 3 TIMES DAILY PRN
Qty: 30 CAPSULE | Refills: 2 | Status: SHIPPED | OUTPATIENT
Start: 2025-02-20

## 2025-02-20 RX ORDER — METHYLPHENIDATE HYDROCHLORIDE 5 MG/1
5-10 TABLET ORAL DAILY PRN
Qty: 60 TABLET | Refills: 0 | Status: SHIPPED | OUTPATIENT
Start: 2025-02-20

## 2025-02-20 RX ORDER — DEXMETHYLPHENIDATE HYDROCHLORIDE 15 MG/1
15 CAPSULE, EXTENDED RELEASE ORAL DAILY
Qty: 30 CAPSULE | Refills: 0 | Status: SHIPPED | OUTPATIENT
Start: 2025-04-30

## 2025-02-20 RX ORDER — DEXMETHYLPHENIDATE HYDROCHLORIDE 15 MG/1
15 CAPSULE, EXTENDED RELEASE ORAL DAILY
Qty: 30 CAPSULE | Refills: 0 | Status: SHIPPED | OUTPATIENT
Start: 2025-04-02

## 2025-02-20 RX ORDER — ESCITALOPRAM OXALATE 20 MG/1
20 TABLET ORAL DAILY
Qty: 90 TABLET | Refills: 0 | Status: SHIPPED | OUTPATIENT
Start: 2025-02-20

## 2025-02-20 RX ORDER — DEXMETHYLPHENIDATE HYDROCHLORIDE 15 MG/1
15 CAPSULE, EXTENDED RELEASE ORAL DAILY
Qty: 30 CAPSULE | Refills: 0 | Status: SHIPPED | OUTPATIENT
Start: 2025-02-20

## 2025-02-20 NOTE — PATIENT INSTRUCTIONS
**For crisis resources, please see the information at the end of this document**   Patient Education    Thank you for coming to the [facility].    Lab Testing:  If you had lab testing today and your results are reassuring or normal they will be mailed to you or sent through [employer] within 7 days. If the lab tests need quick action we will call you with the results. The phone number we will call with results is # [phone] (home) [phone] (work). If this is not the best number please call our clinic and change the number.    Medication Refills:  If you need any refills please call your pharmacy and they will contact us. Our fax number for refills is [phone]. Please allow three business for refill processing. If you need to  your refill at a new pharmacy, please contact the new pharmacy directly. The new pharmacy will help you get your medications transferred.     Scheduling:  If you have any concerns about today's visit or wish to schedule another appointment please call our office during normal business hours [phone] (8-5:00 M-F)    Contact Us:  Please call [phone] during business hours (8-5:00 M-F).  If after clinic hours, or on the weekend, please call  [phone].    Financial Assistance [phone]  [facility] Billing [phone]  Central Billing Office, [facility]: [phone]  [facility] Billing [phone]  Medical Records [phone]  [facility] Patient Bill of Rights [email or web address]        MENTAL HEALTH CRISIS RESOURCES:  For a emergency help, please call 911 or go to the nearest Emergency Department.      Children's Emergency Walk-In Options:   [facility]:  [address] [place] [address], [address]  [facility]:   [address], [address]  [place] - [address],  [place], [address]    Adult Emergency Walk-In Options:  [facility]:  [address], [place], [place], [address]  [facility]:  [address] [place] [address]  [facility]:  [address], [place] [address]  [facility] :  [address] [place] [address]    [facility] Crisis Information:   [place]ligio FLOWER) - Adult: [phone]       Child: [phone]  [place] - Adult: [phone]     Child: [phone]  [place]: [phone]  [place]: [phone]  [place]: [phone]    List of all [facility] resources:   https://[place].gov/dhs/people-we-serve/adults/health-care/mental-health/resources/crisis-contacts.jsp     National Crisis Information:   Call or text: '988'  National Suicide Prevention Lifeline: 5-284-683-TALK (1-176.326.9246) - for online chat options, visit https://suicidepreventionlifeline.org/chat/  Poison Control Center: 6-145-124-8381  Trans Lifeline: 3-582-421-6822 - Hotline for transgender people of all ages  The Luca Project: 8-692-049-8903 - Hotline for LGBT youth      For Non-Emergency Support:   Fast Tracker: Mental Health & Substance Use Disorder Resources -   https://www.fasttrackermn.org/        Again thank you for choosing [facility] and please let us know how we can best partner with you to improve you and your family's health.    You may be receiving a survey regarding this appointment. We would love to have your feedback, both positive and negative. The survey is done by an external company, so your answers are anonymous.

## 2025-02-20 NOTE — NURSING NOTE
"Chief Complaint   Patient presents with    Eval/Assessment       /82 (BP Location: Right arm, Patient Position: Sitting, Cuff Size: Adult Regular)   Pulse 87   Ht 5' 10.75\" (179.7 cm)   Wt 233 lb 1.6 oz (105.7 kg)   BMI 32.74 kg/m      Michael Adler  February 20, 2025   "
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
## 2025-03-10 ENCOUNTER — MYC MEDICAL ADVICE (OUTPATIENT)
Dept: PSYCHIATRY | Facility: CLINIC | Age: 20
End: 2025-03-10
Payer: COMMERCIAL

## 2025-03-10 NOTE — TELEPHONE ENCOUNTER
Per :  - Dexmethylphenidate Er 15 Mg Cp last filled 2/11 #30  - Methylphenidate 5 Mg Tablet last filled 2/24 #60    Per chart review, there are available refills on file. Patient notified via Super.

## 2025-03-17 PROBLEM — G90.A POTS (POSTURAL ORTHOSTATIC TACHYCARDIA SYNDROME): Status: ACTIVE | Noted: 2025-03-17

## 2025-03-30 ENCOUNTER — HOSPITAL ENCOUNTER (EMERGENCY)
Facility: CLINIC | Age: 20
Discharge: HOME | End: 2025-03-31
Attending: EMERGENCY MEDICINE | Admitting: EMERGENCY MEDICINE
Payer: COMMERCIAL

## 2025-03-30 ENCOUNTER — NURSE TRIAGE (OUTPATIENT)
Dept: NURSING | Facility: CLINIC | Age: 20
End: 2025-03-30
Payer: COMMERCIAL

## 2025-03-30 VITALS
HEART RATE: 66 BPM | SYSTOLIC BLOOD PRESSURE: 136 MMHG | TEMPERATURE: 98.4 F | RESPIRATION RATE: 18 BRPM | OXYGEN SATURATION: 96 % | DIASTOLIC BLOOD PRESSURE: 88 MMHG

## 2025-03-30 DIAGNOSIS — R51.9 HEADACHE, UNSPECIFIED HEADACHE TYPE: ICD-10-CM

## 2025-03-30 DIAGNOSIS — M62.838 MUSCLE SPASM: ICD-10-CM

## 2025-03-30 LAB
AMMONIA PLAS-SCNC: 31 UMOL/L (ref 11–51)
BASOPHILS # BLD AUTO: 0.1 10E3/UL (ref 0–0.2)
BASOPHILS NFR BLD AUTO: 1 %
EOSINOPHIL # BLD AUTO: 0.2 10E3/UL (ref 0–0.7)
EOSINOPHIL NFR BLD AUTO: 2 %
ERYTHROCYTE [DISTWIDTH] IN BLOOD BY AUTOMATED COUNT: 12.5 % (ref 10–15)
HCG SERPL QL: NEGATIVE
HCT VFR BLD AUTO: 41.1 % (ref 35–47)
HGB BLD-MCNC: 13.2 G/DL (ref 11.7–15.7)
IMM GRANULOCYTES # BLD: 0 10E3/UL
IMM GRANULOCYTES NFR BLD: 0 %
LACTATE SERPL-SCNC: 0.6 MMOL/L (ref 0.7–2)
LYMPHOCYTES # BLD AUTO: 1.9 10E3/UL (ref 0.8–5.3)
LYMPHOCYTES NFR BLD AUTO: 23 %
MCH RBC QN AUTO: 28.3 PG (ref 26.5–33)
MCHC RBC AUTO-ENTMCNC: 32.1 G/DL (ref 31.5–36.5)
MCV RBC AUTO: 88 FL (ref 78–100)
MONOCYTES # BLD AUTO: 0.6 10E3/UL (ref 0–1.3)
MONOCYTES NFR BLD AUTO: 8 %
NEUTROPHILS # BLD AUTO: 5.4 10E3/UL (ref 1.6–8.3)
NEUTROPHILS NFR BLD AUTO: 66 %
NRBC # BLD AUTO: 0 10E3/UL
NRBC BLD AUTO-RTO: 0 /100
PLATELET # BLD AUTO: 340 10E3/UL (ref 150–450)
RBC # BLD AUTO: 4.66 10E6/UL (ref 3.8–5.2)
WBC # BLD AUTO: 8.1 10E3/UL (ref 4–11)

## 2025-03-30 PROCEDURE — 84703 CHORIONIC GONADOTROPIN ASSAY: CPT | Performed by: EMERGENCY MEDICINE

## 2025-03-30 PROCEDURE — 83605 ASSAY OF LACTIC ACID: CPT | Performed by: EMERGENCY MEDICINE

## 2025-03-30 PROCEDURE — 82550 ASSAY OF CK (CPK): CPT | Performed by: EMERGENCY MEDICINE

## 2025-03-30 PROCEDURE — 82140 ASSAY OF AMMONIA: CPT | Performed by: EMERGENCY MEDICINE

## 2025-03-30 PROCEDURE — 99285 EMERGENCY DEPT VISIT HI MDM: CPT | Mod: 25 | Performed by: EMERGENCY MEDICINE

## 2025-03-30 PROCEDURE — 96374 THER/PROPH/DIAG INJ IV PUSH: CPT | Mod: 59 | Performed by: EMERGENCY MEDICINE

## 2025-03-30 PROCEDURE — 82310 ASSAY OF CALCIUM: CPT | Performed by: EMERGENCY MEDICINE

## 2025-03-30 PROCEDURE — 250N000013 HC RX MED GY IP 250 OP 250 PS 637: Performed by: EMERGENCY MEDICINE

## 2025-03-30 PROCEDURE — 258N000003 HC RX IP 258 OP 636: Performed by: EMERGENCY MEDICINE

## 2025-03-30 PROCEDURE — 85025 COMPLETE CBC W/AUTO DIFF WBC: CPT | Performed by: EMERGENCY MEDICINE

## 2025-03-30 PROCEDURE — 250N000011 HC RX IP 250 OP 636: Performed by: EMERGENCY MEDICINE

## 2025-03-30 PROCEDURE — 84443 ASSAY THYROID STIM HORMONE: CPT | Performed by: EMERGENCY MEDICINE

## 2025-03-30 PROCEDURE — 83735 ASSAY OF MAGNESIUM: CPT | Performed by: EMERGENCY MEDICINE

## 2025-03-30 PROCEDURE — 36415 COLL VENOUS BLD VENIPUNCTURE: CPT | Performed by: EMERGENCY MEDICINE

## 2025-03-30 PROCEDURE — 96361 HYDRATE IV INFUSION ADD-ON: CPT | Performed by: EMERGENCY MEDICINE

## 2025-03-30 PROCEDURE — 82077 ASSAY SPEC XCP UR&BREATH IA: CPT | Performed by: EMERGENCY MEDICINE

## 2025-03-30 PROCEDURE — 82247 BILIRUBIN TOTAL: CPT | Performed by: EMERGENCY MEDICINE

## 2025-03-30 PROCEDURE — 99284 EMERGENCY DEPT VISIT MOD MDM: CPT | Performed by: EMERGENCY MEDICINE

## 2025-03-30 RX ORDER — METOCLOPRAMIDE HYDROCHLORIDE 5 MG/ML
10 INJECTION INTRAMUSCULAR; INTRAVENOUS ONCE
Status: COMPLETED | OUTPATIENT
Start: 2025-03-30 | End: 2025-03-30

## 2025-03-30 RX ORDER — ACETAMINOPHEN 325 MG/1
975 TABLET ORAL ONCE
Status: COMPLETED | OUTPATIENT
Start: 2025-03-30 | End: 2025-03-30

## 2025-03-30 RX ADMIN — METOCLOPRAMIDE 10 MG: 5 INJECTION, SOLUTION INTRAMUSCULAR; INTRAVENOUS at 23:30

## 2025-03-30 RX ADMIN — SODIUM CHLORIDE, POTASSIUM CHLORIDE, SODIUM LACTATE AND CALCIUM CHLORIDE 1000 ML: 600; 310; 30; 20 INJECTION, SOLUTION INTRAVENOUS at 23:17

## 2025-03-30 RX ADMIN — ACETAMINOPHEN 975 MG: 325 TABLET, FILM COATED ORAL at 23:30

## 2025-03-30 ASSESSMENT — ACTIVITIES OF DAILY LIVING (ADL): ADLS_ACUITY_SCORE: 41

## 2025-03-31 ENCOUNTER — APPOINTMENT (OUTPATIENT)
Dept: CT IMAGING | Facility: CLINIC | Age: 20
End: 2025-03-31
Attending: EMERGENCY MEDICINE
Payer: COMMERCIAL

## 2025-03-31 LAB
ALBUMIN SERPL BCG-MCNC: 4.5 G/DL (ref 3.5–5.2)
ALP SERPL-CCNC: 75 U/L (ref 40–150)
ALT SERPL W P-5'-P-CCNC: 23 U/L (ref 0–50)
ANION GAP SERPL CALCULATED.3IONS-SCNC: 13 MMOL/L (ref 7–15)
AST SERPL W P-5'-P-CCNC: 21 U/L (ref 0–45)
BILIRUB SERPL-MCNC: 0.4 MG/DL
BUN SERPL-MCNC: 8.8 MG/DL (ref 6–20)
CALCIUM SERPL-MCNC: 9.6 MG/DL (ref 8.8–10.4)
CHLORIDE SERPL-SCNC: 106 MMOL/L (ref 98–107)
CK SERPL-CCNC: 92 U/L (ref 26–192)
CREAT SERPL-MCNC: 0.78 MG/DL (ref 0.51–0.95)
EGFRCR SERPLBLD CKD-EPI 2021: >90 ML/MIN/1.73M2
ETHANOL SERPL-MCNC: <0.01 G/DL
GLUCOSE SERPL-MCNC: 93 MG/DL (ref 70–99)
HCO3 SERPL-SCNC: 20 MMOL/L (ref 22–29)
MAGNESIUM SERPL-MCNC: 2.2 MG/DL (ref 1.7–2.3)
POTASSIUM SERPL-SCNC: 4.1 MMOL/L (ref 3.4–5.3)
PROT SERPL-MCNC: 7.4 G/DL (ref 6.4–8.3)
SODIUM SERPL-SCNC: 139 MMOL/L (ref 135–145)
TSH SERPL DL<=0.005 MIU/L-ACNC: 3 UIU/ML (ref 0.3–4.2)

## 2025-03-31 PROCEDURE — 70498 CT ANGIOGRAPHY NECK: CPT | Mod: 26 | Performed by: RADIOLOGY

## 2025-03-31 PROCEDURE — 250N000011 HC RX IP 250 OP 636: Performed by: EMERGENCY MEDICINE

## 2025-03-31 PROCEDURE — 70450 CT HEAD/BRAIN W/O DYE: CPT

## 2025-03-31 PROCEDURE — 70496 CT ANGIOGRAPHY HEAD: CPT

## 2025-03-31 PROCEDURE — 70496 CT ANGIOGRAPHY HEAD: CPT | Mod: 26 | Performed by: RADIOLOGY

## 2025-03-31 RX ORDER — IOPAMIDOL 755 MG/ML
67 INJECTION, SOLUTION INTRAVASCULAR ONCE
Status: COMPLETED | OUTPATIENT
Start: 2025-03-31 | End: 2025-03-31

## 2025-03-31 RX ADMIN — IOPAMIDOL 67 ML: 755 INJECTION, SOLUTION INTRAVENOUS at 00:46

## 2025-03-31 ASSESSMENT — ACTIVITIES OF DAILY LIVING (ADL): ADLS_ACUITY_SCORE: 41

## 2025-03-31 NOTE — ED PROVIDER NOTES
"ED Provider Note  [facility]      History     Chief Complaint   Patient presents with    Spasms    \"Brain Fog\"     HPI  Olena Gilmore is a 20 female with ADHD presents with an episode, witnessed by her boyfriend, which presents at bedside and reports that approximately 3 hours prior to arrival patient was having uncontrolled muscle spasms in the back and in legs. Patient denies loss of consciousness but reports having headache and feeling a \"brain fog, almost like I took a Benadryl\". No fevers, chills or vomiting. Patient is slowly ambulatory but feels slightly in the days. Patient awoke this morning in usual state of health.      Physical Exam   BP: 136/88  Pulse: 66  Temp: 98.4  F (36.9  C)  Resp: 18  SpO2: 96 %  Physical Exam  Atraumatic head   Neck supple   Pupils equal and reactive   Skin dry   Slowly ambulatory and steady   Rapid alternating movements   Normal laterally   No facial droop     ED Course, Procedures, & Data      Procedures                Results for orders placed or performed during the hospital encounter of 03/30/25   Comprehensive metabolic panel     Status: Abnormal   Result Value Ref Range    Sodium 139 135 - 145 mmol/L    Potassium 4.1 3.4 - 5.3 mmol/L    Carbon Dioxide (CO2) 20 (L) 22 - 29 mmol/L    Anion Gap 13 7 - 15 mmol/L    Urea Nitrogen 8.8 6.0 - 20.0 mg/dL    Creatinine 0.78 0.51 - 0.95 mg/dL    GFR Estimate >90 >60 mL/min/1.73m2    Calcium 9.6 8.8 - 10.4 mg/dL    Chloride 106 98 - 107 mmol/L    Glucose 93 70 - 99 mg/dL    Alkaline Phosphatase 75 40 - 150 U/L    AST 21 0 - 45 U/L    ALT 23 0 - 50 U/L    Protein Total 7.4 6.4 - 8.3 g/dL    Albumin 4.5 3.5 - 5.2 g/dL    Bilirubin Total 0.4 <=1.2 mg/dL   Magnesium     Status: Normal   Result Value Ref Range    Magnesium 2.2 1.7 - 2.3 mg/dL   TSH with free T4 reflex     Status: Normal   Result Value Ref Range    TSH 3.00 0.30 - 4.20 uIU/mL   Lactic acid whole blood with 1x repeat in 2 hr when >2     Status: " Abnormal   Result Value Ref Range    Lactic Acid, Initial 0.6 (L) 0.7 - 2.0 mmol/L   Ethyl Alcohol Level     Status: Normal   Result Value Ref Range    Alcohol ethyl <0.01 <=0.01 g/dL   HCG qualitative Blood     Status: Normal   Result Value Ref Range    hCG Serum Qualitative Negative Negative   Ammonia     Status: Normal   Result Value Ref Range    Ammonia 31 11 - 51 umol/L   CK total     Status: Normal   Result Value Ref Range    CK 92 26 - 192 U/L   CBC with platelets and differential     Status: None   Result Value Ref Range    WBC Count 8.1 4.0 - 11.0 10e3/uL    RBC Count 4.66 3.80 - 5.20 10e6/uL    Hemoglobin 13.2 11.7 - 15.7 g/dL    Hematocrit 41.1 35.0 - 47.0 %    MCV 88 78 - 100 fL    MCH 28.3 26.5 - 33.0 pg    MCHC 32.1 31.5 - 36.5 g/dL    RDW 12.5 10.0 - 15.0 %    Platelet Count 340 150 - 450 10e3/uL    % Neutrophils 66 %    % Lymphocytes 23 %    % Monocytes 8 %    % Eosinophils 2 %    % Basophils 1 %    % Immature Granulocytes 0 %    NRBCs per 100 WBC 0 <1 /100    Absolute Neutrophils 5.4 1.6 - 8.3 10e3/uL    Absolute Lymphocytes 1.9 0.8 - 5.3 10e3/uL    Absolute Monocytes 0.6 0.0 - 1.3 10e3/uL    Absolute Eosinophils 0.2 0.0 - 0.7 10e3/uL    Absolute Basophils 0.1 0.0 - 0.2 10e3/uL    Absolute Immature Granulocytes 0.0 <=0.4 10e3/uL    Absolute NRBCs 0.0 10e3/uL   CBC with platelets differential     Status: None    Narrative    The following orders were created for panel order CBC with platelets differential.  Procedure                               Abnormality         Status                     ---------                               -----------         ------                     CBC with platelets and ...[[id number]]                      Final result                 Please view results for these tests on the individual orders.     Medications   iopamidol (ISOVUE-370) solution 67 mL (has no administration in time range)   sodium chloride (PF) 0.9% PF flush 90 mL (has no administration in time range)    lactated ringers BOLUS 1,000 mL (0 mLs Intravenous Stopped 3/31/25 0026)   metoclopramide (REGLAN) injection 10 mg (10 mg Intravenous $Given 3/30/25 2330)   acetaminophen (TYLENOL) tablet 975 mg (975 mg Oral $Given 3/30/25 2330)     Labs Ordered and Resulted from Time of ED Arrival to Time of ED Departure   COMPREHENSIVE METABOLIC PANEL - Abnormal       Result Value    Sodium 139      Potassium 4.1      Carbon Dioxide (CO2) 20 (*)     Anion Gap 13      Urea Nitrogen 8.8      Creatinine 0.78      GFR Estimate >90      Calcium 9.6      Chloride 106      Glucose 93      Alkaline Phosphatase 75      AST 21      ALT 23      Protein Total 7.4      Albumin 4.5      Bilirubin Total 0.4     LACTIC ACID WHOLE BLOOD WITH 1X REPEAT IN 2 HR WHEN >2 - Abnormal    Lactic Acid, Initial 0.6 (*)    MAGNESIUM - Normal    Magnesium 2.2     TSH WITH FREE T4 REFLEX - Normal    TSH 3.00     ETHYL ALCOHOL LEVEL - Normal    Alcohol ethyl <0.01     HCG QUALITATIVE PREGNANCY - Normal    hCG Serum Qualitative Negative     AMMONIA - Normal    Ammonia 31     CK TOTAL - Normal    CK 92     CBC WITH PLATELETS AND DIFFERENTIAL    WBC Count 8.1      RBC Count 4.66      Hemoglobin 13.2      Hematocrit 41.1      MCV 88      MCH 28.3      MCHC 32.1      RDW 12.5      Platelet Count 340      % Neutrophils 66      % Lymphocytes 23      % Monocytes 8      % Eosinophils 2      % Basophils 1      % Immature Granulocytes 0      NRBCs per 100 WBC 0      Absolute Neutrophils 5.4      Absolute Lymphocytes 1.9      Absolute Monocytes 0.6      Absolute Eosinophils 0.2      Absolute Basophils 0.1      Absolute Immature Granulocytes 0.0      Absolute NRBCs 0.0       CTA Head Neck with Contrast    (Results Pending)   Head CT w/o contrast    (Results Pending)          Critical care was not performed.     Medical Decision Making  The patient's presentation was of high complexity (an acute health issue posing potential threat to life or bodily function).    The  patient's evaluation involved:  an assessment requiring an independent historian (boyfriend at bedside providing collateral history)  ordering and/or review of 2 test(s) in this encounter (see separate area of note for details)  independent interpretation of testing performed by another health professional (CT head)    The patient's management necessitated high risk (a decision regarding hospitalization).    Assessment & Plan    Involuntary muscle spasms with generalized headache and delirium. Will treat for potential migraine headache with Reglan, IV fluids and Tylenol. Screen for electrolyte abnormality and thyroid disorder. Will rule out pregnancy, rule out intracranial bleed/vessel dissection. Reassess after initial workup.    1245a -repeat exam -patient feels much improved after IV Reglan and Tylenol and fluids, sitting up straight.  Still pending CT CTA.  Labs overall reassuring without any acute findings.  If CT CTA are negative, will discharge after migraine treatment with primary care follow-up    I have reviewed the nursing notes. I have reviewed the findings, diagnosis, plan and need for follow up with the patient.    New Prescriptions    No medications on file       Final diagnoses:   Muscle spasm   Headache, unspecified headache type   This part of the medical record was transcribed by Roxann Sosa Medical Scribe, from a dictation done by Michael Tamayo MD.      Michael Tamayo MD  [facility] EMERGENCY DEPARTMENT  3/30/2025     Michael Tamayo MD  03/31/25 0046

## 2025-03-31 NOTE — TELEPHONE ENCOUNTER
Nurse Triage SBAR    Is this a 2nd Level Triage? NO    Situation: joint pain of finger    Background: all joints hurt in body    Assessment: Patient is calling stating that she has been having on going joint pain that started mainly in her knees but has spread to all her other joints.  They pop and crack when they're moved.  She was cracking the pinky finger on her left hand yesterday and feels the middle joint on that finger is dislocated.  It is the middle joint on left hand, pinky finger and it is swollen, the bone doesn't feel right and has a bump in the middle knuckle.  Doesn't hurt at all.  Pain also in neck, knees, ankles, and toes.  No fever.      Protocol Recommended Disposition:   See PCP Within 3 Days    Recommendation: Care advice given per protocol.  Recommend patient see a provider within the next 3 days.  Patient verbalized understanding information and did not want to be transferred to the  to check for any clinic appointments.  She stated that she lives by an Urgent Care and will go there instead of the clinic.            Does the patient meet one of the following criteria for ADS visit consideration? 16+ years old, with an [facility] PCP     TIP  Providers, please consider if this condition is appropriate for management at one of our Acute and Diagnostic Services sites.     If patient is a good candidate, please use dotphrase <dot>triageresponse and select Refer to ADS to document.    Reason for Disposition   [1] Swollen joint AND [2] no fever or redness    Additional Information   Negative: [1] Swollen joint AND [2] fever   Negative: [1] Looks infected (spreading redness, red streak, pus) AND [2] fever   Negative: [1] Looks infected (spreading redness, red streak, pus) AND [2] severe pain with movement   Negative: Patient sounds very sick or weak to the triager   Negative: [1] Looks infected (spreading redness, red streak, pus) AND [2] large red area (> 2 inches or 5 cm, or entire finger)    Negative: [1] SEVERE pain (e.g., excruciating) AND [2] not improved after 2 hours of pain medicine   Negative: Yellow pus under skin around fingernail (cuticle) or pus under fingernail   Negative: Looks infected (spreading redness, pus)  (Exception: Localized redness and swelling of skin around nail.)   Negative: Painful blisters on fingertip   Negative: [1] Numbness (i.e., loss of sensation) in hand or fingers AND [2] new-onset   Negative: [1] MODERATE pain (e.g., interferes with normal activities) AND [2] present > 3 days    Protocols used: Finger Pain-A-AH

## 2025-03-31 NOTE — ED TRIAGE NOTES
"Pt c/o generalized muscle spasms, muscle weakness, head/neck pain and brain fog x 40 minutes. Pt denies previous similar episodes. Pt states her propranolol dose was adjusted \"a couple weeks ago\" but denies any other changes to her medications. Pt ambulatory with steady gait.         "

## 2025-03-31 NOTE — DISCHARGE INSTRUCTIONS
Referral been placed up on your behalf for primary care appointment within the next 3 to 5 days as a ED follow-up which is important to your health   Please return to emergency department for fever>104F, persistent vomiting, worsening chest pain, shortness of breath   Continue home medications

## 2025-04-14 ENCOUNTER — MYC REFILL (OUTPATIENT)
Dept: FAMILY MEDICINE | Facility: CLINIC | Age: 20
End: 2025-04-14
Payer: COMMERCIAL

## 2025-04-14 DIAGNOSIS — G43.719 INTRACTABLE CHRONIC MIGRAINE WITHOUT AURA AND WITHOUT STATUS MIGRAINOSUS: Primary | ICD-10-CM

## 2025-04-14 NOTE — TELEPHONE ENCOUNTER
RN called patient.   Attempt # 1 of 3.    Left message to call back and talk with a nurse.    Reason for call: follow up on refill request.    When they return call, please do the following:  Find out if they have been taking vitamin B-2 and dose.    Katt Kirby RN, BSN, PHN  [facility]

## 2025-04-15 NOTE — TELEPHONE ENCOUNTER
Left message on answering machine for patient to call back to [phone] or respond to JiaThis message.    Symone GOODN, RN

## 2025-04-16 PROBLEM — G43.719 INTRACTABLE CHRONIC MIGRAINE WITHOUT AURA AND WITHOUT STATUS MIGRAINOSUS: Status: ACTIVE | Noted: 2025-04-16

## 2025-04-16 RX ORDER — RIBOFLAVIN (VITAMIN B2) 100 MG
200 TABLET ORAL 2 TIMES DAILY
Qty: 120 TABLET | Refills: 11 | Status: SHIPPED | OUTPATIENT
Start: 2025-04-16

## 2025-04-16 NOTE — TELEPHONE ENCOUNTER
3rd attempt - This writer attempted to contact patient on 04/16/25      Reason for call refill request and left message.      If patient calls back:   Route to RN line, please find out if patient has been taking vitamin B2 regularly, if so what dose? Is this an OTC med/prescribed by another provider?      Mela Donnelly, RN, BSN  [facility]

## 2025-04-17 ENCOUNTER — MYC MEDICAL ADVICE (OUTPATIENT)
Dept: FAMILY MEDICINE | Facility: CLINIC | Age: 20
End: 2025-04-17

## 2025-04-17 ENCOUNTER — OFFICE VISIT (OUTPATIENT)
Dept: FAMILY MEDICINE | Facility: CLINIC | Age: 20
End: 2025-04-17
Payer: COMMERCIAL

## 2025-04-17 VITALS
HEART RATE: 65 BPM | TEMPERATURE: 97.5 F | RESPIRATION RATE: 16 BRPM | HEIGHT: 71 IN | BODY MASS INDEX: 33.26 KG/M2 | DIASTOLIC BLOOD PRESSURE: 75 MMHG | WEIGHT: 237.6 LBS | OXYGEN SATURATION: 99 % | SYSTOLIC BLOOD PRESSURE: 107 MMHG

## 2025-04-17 DIAGNOSIS — M25.50 MULTIPLE JOINT PAIN: Primary | ICD-10-CM

## 2025-04-17 DIAGNOSIS — M35.7 HYPERMOBILITY SYNDROME: ICD-10-CM

## 2025-04-17 LAB
CRP SERPL-MCNC: <3 MG/L
ERYTHROCYTE [SEDIMENTATION RATE] IN BLOOD BY WESTERGREN METHOD: 7 MM/HR (ref 0–20)

## 2025-04-17 PROCEDURE — 86038 ANTINUCLEAR ANTIBODIES: CPT | Performed by: PEDIATRICS

## 2025-04-17 PROCEDURE — 86140 C-REACTIVE PROTEIN: CPT | Performed by: PEDIATRICS

## 2025-04-17 PROCEDURE — 99214 OFFICE O/P EST MOD 30 MIN: CPT | Performed by: PEDIATRICS

## 2025-04-17 PROCEDURE — 36415 COLL VENOUS BLD VENIPUNCTURE: CPT | Performed by: PEDIATRICS

## 2025-04-17 PROCEDURE — 85652 RBC SED RATE AUTOMATED: CPT | Performed by: PEDIATRICS

## 2025-04-17 RX ORDER — PROPRANOLOL HYDROCHLORIDE 80 MG/1
CAPSULE, EXTENDED RELEASE ORAL
COMMUNITY
Start: 2025-03-13

## 2025-04-17 ASSESSMENT — PATIENT HEALTH QUESTIONNAIRE - PHQ9
SUM OF ALL RESPONSES TO PHQ QUESTIONS 1-9: 11
SUM OF ALL RESPONSES TO PHQ QUESTIONS 1-9: 11
10. IF YOU CHECKED OFF ANY PROBLEMS, HOW DIFFICULT HAVE THESE PROBLEMS MADE IT FOR YOU TO DO YOUR WORK, TAKE CARE OF THINGS AT HOME, OR GET ALONG WITH OTHER PEOPLE: VERY DIFFICULT

## 2025-04-17 NOTE — LETTER
April 17, 2025      Olena Gilmore  [id number] [address] [id number]        To Whom It May Concern:    Olena Gilmore was seen in our clinic. She has a condition called POTS and hypermobile joint syndrome that makes standing or walking for long periods of time difficult.  She may return to work with the following: must be able to take a break for 5 mins every 1-2 hrs to rest.  Also please allow her to sit whenever possible.      Sincerely,      Lyndsey Meyer      Electronically signed

## 2025-04-17 NOTE — PROGRESS NOTES
"  Assessment & Plan     Multiple joint pain  Recommend rheumatology consultation  - Adult Rheumatology  Referral; Future  - Anti Nuclear Kathi IgG by IFA with Reflex; Future  - ESR: Erythrocyte sedimentation rate; Future  - CRP, inflammation; Future  - Anti Nuclear Kathi IgG by IFA with Reflex  - ESR: Erythrocyte sedimentation rate  - CRP, inflammation  - Physical Therapy  Referral; Future    Hypermobility syndrome    - Adult Rheumatology  Referral; Future  - Anti Nuclear Kathi IgG by IFA with Reflex; Future  - ESR: Erythrocyte sedimentation rate; Future  - CRP, inflammation; Future  - Anti Nuclear Kathi IgG by IFA with Reflex  - ESR: Erythrocyte sedimentation rate  - CRP, inflammation  - Physical Therapy  Referral; Future          BMI  Estimated body mass index is 33.14 kg/m  as calculated from the following:    Height as of this encounter: 1.803 m (5' 11\").    Weight as of this encounter: 107.8 kg (237 lb 9.6 oz).             Subjective   Olena is a 20 year old, presenting for the following health issues:  Joint Pain (Has been having joint pain for the last few years)        4/17/2025    11:01 AM   Additional Questions   Roomed by Vandana   Accompanied by BOYFRIEND     History of Present Illness       Reason for visit:  Joint pain  Symptom onset:  More than a month  Symptoms include:  Aching joints, popping and clicking  Symptom intensity:  Moderate  Symptom progression:  Worsening  Had these symptoms before:  No   She is taking medications regularly.      Olena is a 20 year old with a history of POTS, raynaud's disease, pelvic floor dysfunction, ASHLEY, depression, anxiety, ovarian failure and migraines who presents with joint pain and stiffness.   She complains of intermittent pain in multiple joints including ankles, shoulders, elbows, and finger joints.  She also has had chronic knee pain.  She experiences pain and fatigue when walking long distances.  She is currently attending the " "[employer] and struggles to get from class to class.  She also works in a .  She has been told she has hypermobile joints.  Her joints make clicking noises.  She does not believe she has ever dislocated any joints.  She has a nonpainful swelling on her R pinky.  Her joints sometimes look red and get itchy.  She has no history of rashes.  She bruises easily.    Mom has arthritis, not sure what kind.              Review of Systems  Constitutional, HEENT, cardiovascular, pulmonary, gi and gu systems are negative, except as otherwise noted.      Objective    /75   Pulse 65   Temp 97.5  F (36.4  C) (Temporal)   Resp 16   Ht 1.803 m (5' 11\")   Wt 107.8 kg (237 lb 9.6 oz)   SpO2 99%   BMI 33.14 kg/m    Body mass index is 33.14 kg/m .  Physical Exam   GENERAL: alert and no distress  NECK: no adenopathy, no asymmetry, masses, or scars  RESP: lungs clear to auscultation - no rales, rhonchi or wheezes  CV: regular rate and rhythm, normal S1 S2, no S3 or S4, no murmur, click or rub, no peripheral edema  ABDOMEN: soft, nontender, no hepatosplenomegaly, no masses and bowel sounds normal  MS: no gross musculoskeletal defects noted, no edema  SKIN: no suspicious lesions or rashes            Signed Electronically by: Lyndsey Meyer MD    "

## 2025-04-18 LAB — ANA SER QL IF: NEGATIVE

## 2025-04-21 NOTE — RESULT ENCOUNTER NOTE
Dear Olena Gilmore,    Your recent labs are normal.  Please don't hesitate to call me or send a message if you have any questions.    Sincerely,  Lyndsey Meyer M.D.  [phone]

## 2025-04-29 ENCOUNTER — OFFICE VISIT (OUTPATIENT)
Dept: CONSULT | Facility: CLINIC | Age: 20
End: 2025-04-29
Attending: MEDICAL GENETICS
Payer: COMMERCIAL

## 2025-04-29 VITALS
RESPIRATION RATE: 20 BRPM | HEART RATE: 107 BPM | BODY MASS INDEX: 33.73 KG/M2 | DIASTOLIC BLOOD PRESSURE: 79 MMHG | WEIGHT: 240.96 LBS | SYSTOLIC BLOOD PRESSURE: 123 MMHG | OXYGEN SATURATION: 99 % | HEIGHT: 71 IN

## 2025-04-29 DIAGNOSIS — E34.4: ICD-10-CM

## 2025-04-29 DIAGNOSIS — E28.39 ACQUIRED PREMATURE OVARIAN FAILURE: Primary | ICD-10-CM

## 2025-04-29 DIAGNOSIS — G90.A POTS (POSTURAL ORTHOSTATIC TACHYCARDIA SYNDROME): ICD-10-CM

## 2025-04-29 DIAGNOSIS — N91.0 PRIMARY AMENORRHEA: Primary | ICD-10-CM

## 2025-04-29 DIAGNOSIS — Z71.83 ENCOUNTER FOR NONPROCREATIVE GENETIC COUNSELING AND TESTING: ICD-10-CM

## 2025-04-29 DIAGNOSIS — Z13.71 ENCOUNTER FOR NONPROCREATIVE GENETIC COUNSELING AND TESTING: ICD-10-CM

## 2025-04-29 DIAGNOSIS — E28.39 OVARIAN FAILURE: ICD-10-CM

## 2025-04-29 PROCEDURE — G0463 HOSPITAL OUTPT CLINIC VISIT: HCPCS | Performed by: MEDICAL GENETICS

## 2025-04-29 PROCEDURE — 96041 GENETIC COUNSELING SVC EA 30: CPT

## 2025-04-29 PROCEDURE — 99205 OFFICE O/P NEW HI 60 MIN: CPT | Performed by: MEDICAL GENETICS

## 2025-04-29 NOTE — LETTER
"2025      RE: Olena Gilmore  [address]  [facility] [address]     Dear Colleague,    Thank you for the opportunity to participate in the care of your patient, Olena Gilmore, at the [facility] at [facility]. Please see a copy of my visit note below.    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 Olena was here today with her mother. I met with the family at the request of Dr. Mackey to review prior genetic testing results and discuss and obtain consent for  additional testing (exome reanalysis).     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 She also has tall stature (>99th percentile) while her mid parental height predicted her height just below the 50th percentile.    Dr. Andre - Endocrinology 10/1/2024  Olena has had extensive work up done for primary amenorrhea and tall stature. The growth factors were at the lower end of the normal range. The adrenal and thyroid antibodies were negative, making an autoimmune process less likely to be a cause of the ovarian failure. The calcium and phosphorus were normal. The estradiol was low (prepubertal). The inhibin and anti-mullerian hormone were low (in the menopausal range), indicating that Olena didn't have an ovarian reserve. The uterus was not visible on ultrasound and only one ovary was seen. An MRI was done and the uterus was visualized but was reported to be prepubertal. The vagina and cervix were visualized. The ovaries were not identified but a streak tissue was " seen.      - Neurology 3/13/2025  In summary, Olena Gilmore has chronic migraine, POTS visual snow, anxiety, depression, ADHD and ASHLEY untreated. There is a family history of migraine. The neurologic exam is notable for left pronator drift and new onset dizziness. Brain MRI, MRA head and neck were reassuringly normal. Headaches have significantly improved.    Please see Dr. Mackey's note for interval history.  Patient Active Problem List   Diagnosis     Seasonal allergic rhinitis     Dry skin     Skin rash     Primary nocturnal enuresis     Nevus     Abdominal bloating     Class 1 obesity     Nocturnal enuresis     Adjustment disorder with anxious mood     Acanthosis nigricans     Ovarian failure     Primary amenorrhea     Constitutional tall stature     Panic attack     Anxiety     Attention deficit hyperactivity disorder, inattentive type     Overweight     ASHLEY (obstructive sleep apnea)     Patellofemoral pain syndrome of both knees     Dyspareunia in female     Pelvic floor dysfunction     Generalized anxiety disorder with panic attacks     Morbid (severe) obesity due to excess calories (H)     Raynaud's disease without gangrene     Postural dizziness with presyncope     Fatigue, unspecified type     Bruising     Skin picking habit     Moderate episode of recurrent major depressive disorder (H)     POTS (postural orthostatic tachycardia syndrome)     Intractable chronic migraine without aura and without status migrainosus      Past Surgical History:   Procedure Laterality Date     ADENOIDECTOMY  8/8/68     Previous Genetic Testing  Sex chromosome analysis (7/18/2019) [facility] - 46, XX (50 cells analyzes). Normal female.  Fragile X molecular analysis (11/5/2019) [facility] - 31 and 32 repeats, negative/normal.  Ovarian Dysgenesis Next Generation Sequencing Panel (11/21/2019) [facility] - Single  "uncertain variant in an autosomal recessive gene; essentially negative. (SOHLH1: [id number]; c.253G>A (p.Qvr25Xex), heterozygous, exon 3, VUS)  Whole Exome Sequencing (trio) (2/28/2020) [facility] - Negative/normal. No secondary findings identified.    Family History:   A three generation pedigree was previously obtained by patient report and scanned into the EMR. There were no updates to family history information today.     Olena is the older of two children her parents have together.    She has a brother who had early signs of puberty but was \"underweight.\"  He has a history of Kawasaki disease.    Olena qureshi mother has mental health concerns, mild hearing loss diagnosed at age 16.  She has a history of polycystic ovaries and had approximately 18 months of infertility before conceiving Olena.  She had her first period at age 13.  Her height is 5'2\".  Olena qureshi maternal grandfather has a history of hearing loss as well as type 2 diabetes.  This man had a brother with possible infertility.    Olena s father is 47-years-old  He has a history of hypertension, strabismus, and ichthyosis.  His height is 5'10\".   His maternal grandfather also had ichthyosis.    Olena qureshi has a paternal aunt who may have had some fertility concerns.    Olena s paternal grandmother had early menarche.      Olena is of Syriac, [place], [place], and [place] ancestry on her maternal side and Luxembourger, Maltese, Tristanian, and Polish ancestry on her paternal side.  Consanguinity was denied.           Discussion:  We spent time reviewing Olena's prior genetic testing results including a karyotype, Fragile X analysis, ovarian dysgenesis panel and Whole Exome Sequencing.    Karyotype - Negative/normal  A chromosomal karyotype is a genetic test that assesses the quantity and arrangement of a person's chromosomes. This testing can identify extra/missing whole chromosomes and can identify large rearrangements, duplications, and deletions. " "Karyotype does NOT detect all chromosome differences due resolution and technological limitations. Additional testing may be recommended to assess for smaller chromosome differences and/or other genetic variations. Olena had a karyotype that confirmed 46, XX chromosomes.    Fragile X analysis - Negative/normal  We reviewed that Fragile X Syndrome is a genetic condition caused by variants in the FMR1 gene.  Common symptoms of Fragile X Syndrome include developmental delays, learning disabilities, intellectual disability, and autism spectrum disorder. Fragile X Syndrome can affect both males and females.  However, males are typically more severely affected.  This is because the FMR1 gene is located on the X chromosome.  Biological females have 2 copies of the X chromosome and biological males have 1 copy of the X chromosome and 1 copy of the Y chromosome.  One way of thinking about this is that if a female has the variant causing Fragile X Syndrome on 1 X chromosome, we would expect her other X chromosome to be normal and function as a \"backup.\"  Biological males only have 1 copy of the X chromosome, so they would not have this \"backup\" copy.  The fact that the variant causing Fragile X Syndrome is on the X chromosome also has implications for how this condition runs through families.  Affected males inherit the expansion from a typically unaffected mother who has a \"premutation.\"  Some premutation carriers may have health concerns such as Fragile X-associated tremor ataxia syndrome and/or Fragile X-associated primary ovarian insufficiency.    Fragile X Syndrome is a repeat expansion disorder meaning that individuals' symptoms are due to extra genetic information in the form of repeats of genetic information. Specifically, this additional genetic material is in the form of CGG repeats that cause the FMR1 gene to not work properly.  Below are the types of results that we can obtain from genetic testing for Fragile X " Syndrome:    Normal: 6-44 CGG repeats  Typical individual that does not have Fragile X Syndrome  Intermediate: 45-53 CGG repeats  Prone to expansion to premutation; no symptoms expected  Premutation:  CGG repeats  Carriers can have a child with Fragile X Syndrome and may have clinical findings (females may have premature ovarian insufficiency and both sexes may have Fragile X associated tremor ataxia syndrome later in life)  Full Mutation: >200 CGG repeats  Indicates that the individual has Fragile X Syndrome    Olena was found to have 31 CGG repeats on one of their X chromosomes (normal range) and 32 CGG repeats on their other X chromosome (normal range). This means that Olena is NOT a premutation carrier for Fragile X, nor does she have Fragile X syndrome herself.    Ovarian Dysgenesis/Ovarian Failure  Next Generation Sequencing Gene Panel - uninformative. This panel included the following genes: BETTE, BMP15, BNC1, DCAF17, DIAPH2, DMC1, ERCC6, ESR2, FANCM, FIGLA, FMR1, FOXL2, FSHR, GDF9, HARS2, HFM1, MCM8, MCM9, MRPS22, MSH5, NOBOX, NR5A1, SWU710, PATL2, POF1B, POLR2C, LILE6SD, SOHLH1, SOX8, STAG3, SYCE1, TUBB8, WEE2, ZP1, ZP3. This panel identified a variant of uncertain significance in SOHLH1. This gene is associated with autosomal recessive ovarian dysgenesis 5. While Olena s phenotype overlaps with this condition, her genotype does not. Having a heterozygous variant of uncertain significance is not consistent with a diagnosis of this condition. This is essentially a negative result.    Whole Exome Sequencing - Negative/normal.  Whole Exome Sequencing is a broad genetic test that assesses the coding regions (exons) of a person's genes to try to determine if there is an underlying genetic cause for their features/symptoms. This test included samples from Olena's mother and father as well. This testing was also negative:  Causative variant(s) in disease genes associated with reported phenotype: None  Identified  Variant(s) in genes possibly associated with reported phenotype: None Identified  [facility] Secondary Findings: None Identified      We reviewed that this negative result does not rule out a genetic basis for the clinical features reported in this proband. It is possible that this proband has a pathogenic variant outside of the coding regions analyzed, or in a regulatory or deep intronic region that would not be detected by exome sequencing. The clinical sensitivity of the exome sequencing depends in part on the proband's clinical phenotype.    Reanalysis  We discussed that we can submit for a reanalysis of the exome sequencing data at [facility] for no charge. Reanalysis of the data obtained from the original testing is improved by the updated algorithms and variant databases since it was first analyzed in 2020.     Olena elected to proceed with re-analysis at this time. We reviewed that this is no-charge for the first reanalysis and that additional sample(s) are not required, as it is just a reanalysis of the data from the original testing.    We reviewed [facility] Secondary Findings and discussed that the list of included genes as been updated since Olena's original testing. Previously, the family opted IN for Secondary findings for the proband and relatives. The family reaffirmed their desire to OPT IN for secondary findings for Olena and relatives for the reanalysis.    If Olena's reanalysis is negative, we could meet (GC only visit) to discuss the option of Whole Genome Sequencing which is a test that utilizes additional technologies (assessment of intronic variants for example) when analyzing a person's genetic information. Genome sequencing would also require samples from Oelna's parents, who would need to attend that GC visit with her to give their consent. This visit can be telehealth or in person depending on the family's preference.    Plan  1. Reanalysis of Whole Exome Sequencing performed at [facility]  in 2020  2. Results expected in 10-12 weeks; I will call when available  3. If negative, Whole Genome Sequencing could be considered. This would require a  visit with Olena and ideally both her parents to review and obtain consent.  4. Additional recommendations per Dr. Mackey     Please do not hesitate to reach out with any questions or concerns. It was a pleasure to participate in Olena's care today.       Nat Alcazar MS, [facility]  Genetic Counseling  [facility]  Email: [email or web address]  Phone: [phone]  Fax: [phone]    40 minutes spent on the date of the encounter in chart review, patient visit, test coordination/review, documentation, and/or discussion with other providers about the issues documented above.     This visit was observed by genetic counseling student Sisi Porras.       Please do not hesitate to contact me if you have any questions/concerns.     Sincerely,       Nat Alcazar, ARMANDO

## 2025-04-29 NOTE — PROGRESS NOTES
GENETICS CLINIC CONSULTATION     Name:  Olena Gilmore  :   2005  MRN:   [id number]  Date of service: 2025  Primary Provider: Lyndsey Meyer  Referring Provider: Michael Andre    Reason for consultation:  A consultation in the [facility] was requested by Michael Andre for Olena, a 20 year old female, for evaluation of tall stature and primary ovarian failure.  Olena is also experiencing joint popping and pain and has recently been diagnosis as having POTS.  Olena was accompanied to this visit by her mother. She also saw our genetic counselor at this visit.       Assessment:    ***  Assessment & Plan  Acquired premature ovarian failure:  - Streak ovaries present, not due to Ku syndrome. 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 No specific genetic cause identified with previous tests.  - Recommend reanalysis of previous exome testing. If negative, consider broader genome testing to explore genetic architecture.    Joint pain and hypermobility concerns:  - No significant joint hypermobility observed. Symptoms include joint popping and pain, possibly related to POTS.  - Recommend physical therapy to build strength around joints, particularly hips, knees, and ankles. Encourage maintaining physical activity to manage symptoms. Suggest wearing supportive footwear with good arch support.    Postural Orthostatic Tachycardia Syndrome (POTS):  - Diagnosis of POTS confirmed. Symptoms include dizziness and fatigue, managed with hydration and salt intake.  - Continue current management strategies. Monitor symptoms and adjust lifestyle as needed.    Plan:     Ordered at this visit:   No orders of the defined types were placed in this encounter.      Genetic counseling consultation with Nat Alcazar MS, [facility] to obtain a pedigree and for genetic counseling regarding exome reanalysis with consideration of additional testing if  no new findings are noted.   Return to the Genetics Clinic for follow-up depending on the results of testing.      -----  History of Present Illness:  Patient Active Problem List   Diagnosis    Seasonal allergic rhinitis    Dry skin    Skin rash    Primary nocturnal enuresis    Nevus    Abdominal bloating    Class 1 obesity    Nocturnal enuresis    Adjustment disorder with anxious mood    Acanthosis nigricans    Ovarian failure    Primary amenorrhea    Constitutional tall stature    Panic attack    Anxiety    Attention deficit hyperactivity disorder, inattentive type    Overweight    ASHLEY (obstructive sleep apnea)    Patellofemoral pain syndrome of both knees    Dyspareunia in female    Pelvic floor dysfunction    Generalized anxiety disorder with panic attacks    Morbid (severe) obesity due to excess calories (H)    Raynaud's disease without gangrene    Postural dizziness with presyncope    Fatigue, unspecified type    Bruising    Skin picking habit    Moderate episode of recurrent major depressive disorder (H)    POTS (postural orthostatic tachycardia syndrome)    Intractable chronic migraine without aura and without status migrainosus       History of Present Illness-  Olena MARYAM Shankarawlly, 20-year-old female    - Recently diagnosed with POTS, experiencing tiredness and occasional dizziness, improved with hydration and salt management.  - Concerns about hypermobility and joint pain, affecting daily activities like walking and gym workouts.  - Previous chromosome analysis showed 2X chromosome constitution, ruling out Ku syndrome as the cause for streak ovaries.  - Initial genetic tests for disorders of sexual development and a more general gene test did not identify a cause for streak ovaries or tall stature.  - Joint pain primarily in ankles, shoulders, elbows, and fingers, with a lump on the pinky joint.  - Experiences constipation and loose stools, sometimes after eating.  - No specific injury reported for joint  issues.    Review of available medical records:  The following is from my evaluation done1/20/2020:  Seen for primary ovarian failure:  Olena has been a generally healthy young lady.  Her mom became concerned when, as she began growing that she never got her period.  They also noted that although she had initiation of breast development, her breasts did not grow further.  Because of this, her primary provider did FSH testing and found this to be notably elevated.  This prompted referral to endocrinology for further assessment.    Currently, she is being treated with estrogen patches.  Genetic testing has been undertaken that is thus far negative, see below       Review of available medical records:    Seen in endocrinology for failure to initiate menstruation. Ultrasonography and MRI showed streak gonads and a prepubertal uterus. FSH was elevated. Noted to be tall for her age.     Has been followed in weight management clinic    10/22/19: seen by genetic counselor Maryann Breaux.  Olena was referred for genetic counseling and testing by her endocrine team. Obtained a panel of genes identified in ovarian failure  BMP15, BNC1, IAPH2, DMC1, ERCC6, ESR2, FANCM, FIGLA, FMR1, FOXL2, FSHR, HFM1, MCM8, MCM9, MRPS22, MSH5, NOBOX, NR5A1, QBA206, PATL2, POF1B, POLR2C, BVYA5AW, SOHLH1, SOX8, STAG3, SYCE1, TUBB8, WEE2, ZP1, GF7GPS8, BETTE, DCAF17, and HARS2.    Pertinent studies/abnormal test results:  Normal female karyotype 46, XX  Gene panel as noted above was negative with no significant variants identified    Imaging results:   US PELVIS COMPLETE WITHOUT TRANSVAGINAL   9/11/2019 9:33 AM       HISTORY: Primary amenorrhea; Ovarian failure.     COMPARISON: None     FINDINGS: Transabdominal imaging. The uterus is not identified. There  is no free fluid in the pelvis.     The right ovary measures 1.4 cm x 2.1 cm x 1.8 cm, volume =  2.8 cm3.  The left ovary was not identified. There is no adnexal mass.                                                                     IMPRESSION: No uterus identified. 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 Only the right ovary  was identified. The left ovary could be present but not seen, or could  be absent as part of the syndrome. Both kidneys are present by prior  ultrasound.     ROCAEL REID MD  ---  Exam: MR PELVIS (GYN) WO & W CONTRAST, 10/2/2019 3:58 PM     Indication: Malposition of uterus; Absent uterus and only one ovary  visualized on ultrasound, possible disorder of sex development;  Amenorrhea; Acquired premature ovarian failure; Absence of uterus     Comparison: Ultrasound from 9/11/2019.     Technique: Multiplanar and multisequence MRI of the pelvis was  obtained without and with intravenous contrast.  Contrast dose: 8 mL of Gadavist     Findings:   Pelvis: On series 8, sagittal T1 sequence, there is identification of  the vagina, cervix, and a small prepubertal uterus. There is  ill-defined linear tissue which extends outward from the uterus,  compatible with adnexal tissue, but no discrete ovarian tissue or  ovarian follicles are appreciated. No mass lesion or substantial free  fluid.     Bowel is nonobstructive and decompressed. Visualized portions of the  kidneys are normal. There is no suspicious adenopathy within the  pelvis. Rectum is decompressed.     Bones: No suspicious bony lesion.                                                                   Impression:  1. Identification of the vagina, cervix, and a prepubertal uterus.  2. Adnexa are linear and ill-defined without discrete ovarian tissue.  Uncertain if this represents prepubertal state or streaky gonadal  tissue.     MARCO ANTONIO ISIDRO MD  --XR HAND BONE AGE  9/11/2019 9:36 AM     HISTORY: Primary amenorrhea; Ovarian failure     COMPARISON: None     FINDINGS:   The patient's chronologic age is 14 years 6 months.  The patient's bone age is in between 12 and 13 years.   Two standard deviations of the  mean for a Female at this chronologic  age is 24 months.                                                                      IMPRESSION: Bone age near the lower limits of normal for chronologic  age.     ROCAEL REID MD    Pertinent studies/abnormal test results:   Trio exome done 2020:  no diagnostic findings    Imaging results: No new imaging beyond those noted, above    Past Medical History:  Pregnancy/ History:  See note from 2020    Past Medical History:   Diagnosis Date    GERD (gastroesophageal reflux disease)     Obesity, pediatric, BMI 85th to less than 95th percentile for age 2017     Hospitalization History:none since last evaluation    Surgical History:  None since last evaluation    Other health services currently received are primary care, endocrinology, genetic counseling, and physical therapy .       Review of Systems:  Constitional: tall stature  Eyes: negative - normal vision  Ears/Nose/Throat: negative - normal hearing  Respiratory: negative  Cardiovascular: POTS, rapid heart rate  Gastrointestinal: problems with both loose stools and constipation  Genitourinary: negative  Hematologic/Lymphatic: negative  Allergy/Immunologic: negative - no drug allergies  Musculoskeletal: popping joints and concerns about hypermobility - see above  Endocrine: negative  Integument: negative  Neurologic: negative  Psychiatric: negative    Remainder of comprehensive review of systems is complete and negative.    Personal History  Family History:    A detailed pedigree was obtained at the time of this appointment and is available in the chart.  Family history is significant for ***.  Maternal ethnicity is ***.  Paternal ethnicity is ***.  Consanguinity {Consanguinity denied / reported:81166}.  Remainder of history was non-contributory.  Family History   Problem Relation Age of Onset    Bipolar Disorder Mother         also schizoaffective disorder, under care of  nghia    Other - See Comments  Maternal Uncle         Possible blood clots, Mom thinks it may have to do with running marathons? She is checking into this and will MyChart message     Social History:  Lives with {Household:324201}  Current insurance status state/federal program (Medicaid/Medicare).     I have reviewed Olena s past medical history, family history, social history, medications and allergies as documented in the electronic medical record.  There were no additional findings except as noted.    Medications:  Current Outpatient Medications   Medication Sig Dispense Refill    azithromycin (ZITHROMAX) 250 MG tablet Two tablets first day, then one tablet daily for four days. 6 tablet 0    benzonatate (TESSALON) 100 MG capsule Take 1 capsule (100 mg) by mouth 3 times daily as needed for cough. 20 capsule 0    [START ON 4/30/2025] dexmethylphenidate (FOCALIN XR) 15 MG 24 hr capsule Take 1 capsule (15 mg) by mouth daily. 30 capsule 0    dexmethylphenidate (FOCALIN XR) 15 MG 24 hr capsule Take 1 capsule (15 mg) by mouth daily. 30 capsule 0    dexmethylphenidate (FOCALIN XR) 15 MG 24 hr capsule Take 1 capsule (15 mg) by mouth daily. 30 capsule 0    drospirenone-ethinyl estradiol (AGUSTIN) 3-0.02 MG tablet Take 1 tablet by mouth daily. 28 tablet 5    escitalopram (LEXAPRO) 20 MG tablet Take 1 tablet (20 mg) by mouth daily. 90 tablet 0    estradiol (ESTRACE) 2 MG tablet Take 1 tablet by mouth daily.      hydrOXYzine kp (VISTARIL) 25 MG capsule Take 1 capsule (25 mg) by mouth 3 times daily as needed for anxiety. 30 capsule 2    methylphenidate (RITALIN) 5 MG tablet Take 1-2 tablets (5-10 mg) by mouth daily as needed (ADHD). 60 tablet 0    naproxen sodium (ANAPROX) 220 MG tablet Take 3 tablets (660 mg) by mouth twice a day as needed (headache. Take with food. Limit to 4 days per week.).      ofloxacin (FLOXIN) 0.3 % otic solution Place 5 drops Into the left ear 2 times daily. 10 mL 0    prochlorperazine (COMPAZINE) 10 MG tablet Take 0.5-1 tablets  (5-10 mg) by mouth every 8 hours as needed for nausea, vomiting or other (headache). 30 tablet 3    propranolol ER (INDERAL LA) 60 MG 24 hr capsule Take 1 capsule (60 mg) by mouth at bedtime 30 capsule 5    propranolol ER (INDERAL LA) 80 MG 24 hr capsule       ubrogepant (UBRELVY) 100 MG tablet Take 1 tablet (100 mg) by mouth at onset of headache (migraine. May repeat in 2 hours as needed. Max 2 tabs in 24 hours). 16 tablet 11    vitamin B-2 (RIBOFLAVIN) 100 MG TABS tablet Take 2 tablets (200 mg) by mouth 2 times daily. 120 tablet 11       Allergies:  Allergies   Allergen Reactions    Vancomycin Headache, Itching, Swelling and Unknown     Physical Examination:  not currently breastfeeding.  Weight %tile:Facility age limit for growth %lian is 20 years.  Height %tile: Facility age limit for growth %lian is 20 years.  Head Circumference %tile: Facility age limit for growth %lian is 20 years.  BMI %tile: No height and weight on file for this encounter.  *** Constitutional: This was a well-developed, well nourished female who responded appropriately to all requests during the examination.    Head and Neck:  She had hair of normal texture and distribution and her head was proportionate in appearance.  The face was symmetric and did not have dysmorphic features.   Eyes:  The pupils were equal, round, and reacted to light.   The conjunctivae were clear.   Ears:  Her ears were normal in architecture and placement.   Nose: The nose was clear.    Mouth and Throat: The throat was without erythema.    Respiratory: The chest was clear to auscultation and had a symmetric appearance.    Cardiovascular:  On examination of the heart, the rhythm was regular and there was no murmur.  The peripheral pulses were normal.    Gastrointestinal: The abdomen was soft and had normal bowel sounds.  There was no hepatosplenomegaly.    : I deferred a  examination.   Musculoskeletal: There was a full range of motion on the extremity exam, and  normal muscular volume and bulk.   Neurologic: The neurologic exam was normal, with normal cranial nerves, normal deep tendon reflexes, normal sensation, and a normal gait. She had normal tone.   Integument: The skin was normal with no rashes or unusual pigmentation. The dentition was normal. The nails on her hands were normal in architecture.  Her toenails are deep set and hyper convex..  She had normal dermatoglyphics.  Breasts are Cristobal stage 3    ---  Results of laboratory studies collected at this visit and available at the time of this note:  No results found for any visits on 04/29/25.    BOB REAL M.D., FAAP, [facility]  Professor   Division of Genetics and Metabolism  Department of Pediatrics  [facility]    Routed to family in [facility]  Also to  Lyndsey Meyer [facility] Scott Miller  ***      *** minutes spent on the date of the encounter doing chart review, history and exam, documentation and further activities per the note     Awake/Alert/Cooperative

## 2025-04-29 NOTE — LETTER
this visit by her mother. She also saw our genetic counselor at this visit.       Assessment:    Assessment & Plan  Premature ovarian failure:  - Streak ovaries present, chromosome analysis shows XX chromosome constitution, female, Ku syndrme is ruled out by this testig. No specific genetic cause identified with previous tests inclu.  - Recommend reanalysis of previous exome testing. If negative, consider broader genome testing to explore genetic architecture.    Joint pain and hypermobility concerns:  - No significant joint hypermobility observed. Symptoms include joint popping and pain, possibly related to POTS.  - Recommend physical therapy to build strength around joints, particularly hips, knees, and ankles. Encourage maintaining physical activity to manage symptoms. Suggest wearing supportive footwear with good arch support.    Postural Orthostatic Tachycardia Syndrome (POTS):  - Diagnosis of POTS confirmed. Symptoms include dizziness and fatigue, managed with hydration and salt intake.  - Continue current management strategies. Monitor symptoms and adjust lifestyle as needed.    Plan:     Ordered at this visit:   No orders of the defined types were placed in this encounter.      Genetic counseling consultation with Nat Alcazar MS, [facility] to obtain a pedigree and for genetic counseling regarding exome reanalysis with consideration of additional testing if no new findings are noted.   Return to the Genetics Clinic for follow-up depending on the results of testing.      -----  History of Present Illness:  Patient Active Problem List   Diagnosis     Seasonal allergic rhinitis     Dry skin     Skin rash     Primary nocturnal enuresis     Nevus     Abdominal bloating     Class 1 obesity     Nocturnal enuresis     Adjustment disorder with anxious mood     Acanthosis nigricans     Ovarian failure     Primary amenorrhea     Constitutional tall stature     Panic attack     Anxiety     Attention deficit  hyperactivity disorder, inattentive type     Overweight     ASHLEY (obstructive sleep apnea)     Patellofemoral pain syndrome of both knees     Dyspareunia in female     Pelvic floor dysfunction     Generalized anxiety disorder with panic attacks     Morbid (severe) obesity due to excess calories (H)     Raynaud's disease without gangrene     Postural dizziness with presyncope     Fatigue, unspecified type     Bruising     Skin picking habit     Moderate episode of recurrent major depressive disorder (H)     POTS (postural orthostatic tachycardia syndrome)     Intractable chronic migraine without aura and without status migrainosus       History of Present Illness-  Olena Gilmore, 20-year-old female    - Recently diagnosed with POTS, experiencing tiredness and occasional dizziness, improved with hydration and salt management.  - Concerns about hypermobility and joint pain, affecting daily activities like walking and gym workouts.  - Previous chromosome analysis showed 2X chromosome constitution, ruling out Ku syndrome as the cause for streak ovaries.  - Initial genetic tests for disorders of sexual development and a more general gene test did not identify a cause for streak ovaries or tall stature.  - Joint pain primarily in ankles, shoulders, elbows, and fingers, with a lump on the pinky joint.  - Experiences constipation and loose stools, sometimes after eating.  - No specific injury reported for joint issues.    Review of available medical records:  The following is from my evaluation done1/20/2020:  Seen for primary ovarian failure:  Olena has been a generally healthy young lady.  Her mom became concerned when, as she began growing that she never got her period.  They also noted that although she had initiation of breast development, her breasts did not grow further.  Because of this, her primary provider did FSH testing and found this to be notably elevated.  This prompted referral to endocrinology for  further assessment.    Currently, she is being treated with estrogen patches.  Genetic testing has been undertaken that is thus far negative, see below       Review of available medical records:    Seen in endocrinology for failure to initiate menstruation. Ultrasonography and MRI showed streak gonads and a prepubertal uterus. FSH was elevated. Noted to be tall for her age.     Has been followed in weight management clinic    10/22/19: seen by genetic counselor Maryann BreauxDebra Hyatt was referred for genetic counseling and testing by her endocrine team. Obtained a panel of genes identified in ovarian failure  BMP15, BNC1, IAPH2, DMC1, ERCC6, ESR2, FANCM, FIGLA, FMR1, FOXL2, FSHR, HFM1, MCM8, MCM9, MRPS22, MSH5, NOBOX, NR5A1, PJD601, PATL2, POF1B, POLR2C, RGPA1LE, SOHLH1, SOX8, STAG3, SYCE1, TUBB8, WEE2, ZP1, LZ4UDZ0, BETTE, DCAF17, and HARS2.    Pertinent studies/abnormal test results:  Normal female karyotype 46, XX  Gene panel as noted above was negative with no significant variants identified    Imaging results:   US PELVIS COMPLETE WITHOUT TRANSVAGINAL   9/11/2019 9:33 AM       HISTORY: Primary amenorrhea; Ovarian failure.     COMPARISON: None     FINDINGS: Transabdominal imaging. The uterus is not identified. There  is no free fluid in the pelvis.     The right ovary measures 1.4 cm x 2.1 cm x 1.8 cm, volume =  2.8 cm3.  The left ovary was not identified. There is no adnexal mass.                                                                    IMPRESSION: No uterus identified. This could indicate  Ekjzf-Gbyaksspze-G?ster-[place] syndrome type I. Only the right ovary  was identified. The left ovary could be present but not seen, or could  be absent as part of the syndrome. Both kidneys are present by prior  ultrasound.     ROCAEL REID MD  ---  Exam: MR PELVIS (GYN) WO & W CONTRAST, 10/2/2019 3:58 PM     Indication: Malposition of uterus; Absent uterus and only one ovary  visualized on ultrasound, possible  disorder of sex development;  Amenorrhea; Acquired premature ovarian failure; Absence of uterus     Comparison: Ultrasound from 9/11/2019.     Technique: Multiplanar and multisequence MRI of the pelvis was  obtained without and with intravenous contrast.  Contrast dose: 8 mL of Gadavist     Findings:   Pelvis: On series 8, sagittal T1 sequence, there is identification of  the vagina, cervix, and a small prepubertal uterus. There is  ill-defined linear tissue which extends outward from the uterus,  compatible with adnexal tissue, but no discrete ovarian tissue or  ovarian follicles are appreciated. No mass lesion or substantial free  fluid.     Bowel is nonobstructive and decompressed. Visualized portions of the  kidneys are normal. There is no suspicious adenopathy within the  pelvis. Rectum is decompressed.     Bones: No suspicious bony lesion.                                                                   Impression:  1. Identification of the vagina, cervix, and a prepubertal uterus.  2. Adnexa are linear and ill-defined without discrete ovarian tissue.  Uncertain if this represents prepubertal state or streaky gonadal  tissue.     MARCO ANTONIO ISIDRO MD  --XR HAND BONE AGE  9/11/2019 9:36 AM     HISTORY: Primary amenorrhea; Ovarian failure     COMPARISON: None     FINDINGS:   The patient's chronologic age is 14 years 6 months.  The patient's bone age is in between 12 and 13 years.   Two standard deviations of the mean for a Female at this chronologic  age is 24 months.                                                                      IMPRESSION: Bone age near the lower limits of normal for chronologic  age.     ROCAEL REID MD    Pertinent studies/abnormal test results:   Sex chromosome analysis (7/18/2019) [facility] - 46, XX (50 cells analyzes). Normal female.  Fragile X molecular analysis (11/5/2019) [facility] - 31 and 32 repeats,  "negative/normal.  Ovarian Dysgenesis Next Generation Sequencing Panel (2019) [facility] - Single uncertain variant in an autosomal recessive gene; essentially negative. (SOHLH1: [id number]; c.253G>A (p.Sfx10Reg), heterozygous, exon 3, VUS)  Whole Exome Sequencing (trio) (2020) [facility] - Negative/normal. No secondary findings identified.    Imaging results: No new imaging beyond those noted, above    Past Medical History:  Pregnancy/ History:  See note from 2020    Past Medical History:   Diagnosis Date     GERD (gastroesophageal reflux disease)      Obesity, pediatric, BMI 85th to less than 95th percentile for age 2017     Hospitalization History:none since last evaluation    Surgical History:  None since last evaluation    Other health services currently received are primary care, endocrinology, genetic counseling, and physical therapy .       Review of Systems:  Constitional: tall stature  Eyes: negative - normal vision  Ears/Nose/Throat: negative - normal hearing  Respiratory: negative  Cardiovascular: POTS, rapid heart rate  Gastrointestinal: problems with both loose stools and constipation  Genitourinary: negative  Hematologic/Lymphatic: negative  Allergy/Immunologic: negative - no drug allergies  Musculoskeletal: popping joints and concerns about hypermobility - see above  Endocrine: negative  Integument: negative  Neurologic: negative  Psychiatric: negative    Remainder of comprehensive review of systems is complete and negative.    Personal History  Family History:    A three generation pedigree was previously obtained by patient report and scanned into the EMR. There were no updates to family history information at this visit.     Olena is the older of two children her parents have together.    She has a brother who had early signs of puberty but was \"underweight.\"  He has a history of Kawasaki disease.    Olena s mother " "has mental health concerns, mild hearing loss diagnosed at age 16.  She has a history of polycystic ovaries and had approximately 18 months of infertility before conceiving Olena.  She had her first period at age 13.  Her height is 5'2\".  Olena qureshi maternal grandfather has a history of hearing loss as well as type 2 diabetes.  This man had a brother with possible infertility.    Olena s father is 47-years-old  He has a history of hypertension, strabismus, and ichthyosis.  His height is 5'10\".   His maternal grandfather also had ichthyosis.    Olena qureshi has a paternal aunt who may have had some fertility concerns.    Olena qureshi paternal grandmother had early menarche.       Olena is of French, [place], Portuguese, and Swedish ancestry on her maternal side and Portuguese, Swiss, Lithuanian, and Polish ancestry on her paternal side.  Consanguinity was denied.    Social History:  Current insurance status state/federal program (Medicaid/Medicare).     I have reviewed Olena qureshi past medical history, family history, social history, medications and allergies as documented in the electronic medical record.  There were no additional findings except as noted.    Medications:  Current Outpatient Medications   Medication Sig Dispense Refill     azithromycin (ZITHROMAX) 250 MG tablet Two tablets first day, then one tablet daily for four days. 6 tablet 0     benzonatate (TESSALON) 100 MG capsule Take 1 capsule (100 mg) by mouth 3 times daily as needed for cough. 20 capsule 0     [START ON 4/30/2025] dexmethylphenidate (FOCALIN XR) 15 MG 24 hr capsule Take 1 capsule (15 mg) by mouth daily. 30 capsule 0     dexmethylphenidate (FOCALIN XR) 15 MG 24 hr capsule Take 1 capsule (15 mg) by mouth daily. 30 capsule 0     dexmethylphenidate (FOCALIN XR) 15 MG 24 hr capsule Take 1 capsule (15 mg) by mouth daily. 30 capsule 0     drospirenone-ethinyl estradiol (AGUSTIN) 3-0.02 MG tablet Take 1 tablet by mouth daily. 28 tablet 5     escitalopram (LEXAPRO) " "20 MG tablet Take 1 tablet (20 mg) by mouth daily. 90 tablet 0     estradiol (ESTRACE) 2 MG tablet Take 1 tablet by mouth daily.       hydrOXYzine kp (VISTARIL) 25 MG capsule Take 1 capsule (25 mg) by mouth 3 times daily as needed for anxiety. 30 capsule 2     methylphenidate (RITALIN) 5 MG tablet Take 1-2 tablets (5-10 mg) by mouth daily as needed (ADHD). 60 tablet 0     naproxen sodium (ANAPROX) 220 MG tablet Take 3 tablets (660 mg) by mouth twice a day as needed (headache. Take with food. Limit to 4 days per week.).       ofloxacin (FLOXIN) 0.3 % otic solution Place 5 drops Into the left ear 2 times daily. 10 mL 0     prochlorperazine (COMPAZINE) 10 MG tablet Take 0.5-1 tablets (5-10 mg) by mouth every 8 hours as needed for nausea, vomiting or other (headache). 30 tablet 3     propranolol ER (INDERAL LA) 60 MG 24 hr capsule Take 1 capsule (60 mg) by mouth at bedtime 30 capsule 5     propranolol ER (INDERAL LA) 80 MG 24 hr capsule        ubrogepant (UBRELVY) 100 MG tablet Take 1 tablet (100 mg) by mouth at onset of headache (migraine. May repeat in 2 hours as needed. Max 2 tabs in 24 hours). 16 tablet 11     vitamin B-2 (RIBOFLAVIN) 100 MG TABS tablet Take 2 tablets (200 mg) by mouth 2 times daily. 120 tablet 11       Allergies:  Allergies   Allergen Reactions     Vancomycin Headache, Itching, Swelling and Unknown     Physical Examination:  Blood pressure 123/79, pulse 107, resp. rate 20, height 5' 10.95\" (180.2 cm), weight 240 lb 15.4 oz (109.3 kg), head circumference 56 cm (22.05\"), SpO2 99%, not currently breastfeeding.  *** Constitutional: This was a well-developed, well nourished female who responded appropriately to all requests during the examination.    Head and Neck:  She had hair of normal texture and distribution and her head was proportionate in appearance.  The face was symmetric and did not have dysmorphic features.   Eyes:  The pupils were equal, round, and reacted to light.   The conjunctivae were " clear.   Ears:  Her ears were normal in architecture and placement.   Nose: The nose was clear.    Mouth and Throat: The throat was without erythema.    Respiratory: The chest was clear to auscultation and had a symmetric appearance.    Cardiovascular:  On examination of the heart, the rhythm was regular and there was no murmur.  The peripheral pulses were normal.    Gastrointestinal: The abdomen was soft and had normal bowel sounds.  There was no hepatosplenomegaly.    : I deferred a  examination.   Musculoskeletal: There was a full range of motion on the extremity exam, and normal muscular volume and bulk.   Neurologic: The neurologic exam was normal, with normal cranial nerves, normal deep tendon reflexes, normal sensation, and a normal gait. She had normal tone.   Integument: The skin was normal with no rashes or unusual pigmentation. The dentition was normal. The nails on her hands were normal in architecture.  Her toenails are deep set and hyper convex..  She had normal dermatoglyphics.  Breasts are Cristobal stage 3    ---  Results of laboratory studies collected at this visit and available at the time of this note:  No results found for any visits on 04/29/25.    JUDY REAL M.D., FAAP, FAC  Professor   Division of Genetics and Metabolism  Department of Pediatrics  [facility]    Routed to [facility]  Also to  Lyndsey Meyer Bradley Scott Miller  ***      *** minutes spent on the date of the encounter doing chart review, history and exam, documentation and further activities per the note       Please do not hesitate to contact me if you have any questions/concerns.     Sincerely,       Judy Real MD

## 2025-04-29 NOTE — Clinical Note
2025      RE: Olena Gilmore  [address] [address] [facility] [address]  [facility] [address]     Dear Colleague,    Thank you for the opportunity to participate in the care of your patient, Olena Gilmore, at the [facility] at [facility]. Please see a copy of my visit note below.    GENETICS CLINIC CONSULTATION     Name:  Olena Gilmore  :   2005  MRN:   [id number]  Date of service: 2025  Primary Provider: Lyndsey Meyer  Referring Provider: Michael Andre    Reason for consultation:  A consultation in the [facility] was requested by Michael Andre for Olena, a 20 year old female, for evaluation of tall stature and primary ovarian failure.  Olena is also experiencing joint popping and pain and has recently been diagnosis as having POTS.  Olena was accompanied to this visit by her mother. She also saw our genetic counselor at this visit.       Assessment:    ***  Assessment & Plan  Acquired premature ovarian failure:  - Streak ovaries present, not due to Ku syndrome. 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 No specific genetic cause identified with previous tests.  - Recommend reanalysis of previous exome testing. If negative, consider broader genome testing to explore genetic architecture.    Joint pain and hypermobility concerns:  - No significant joint hypermobility observed. Symptoms include joint popping and pain, possibly related to POTS.  - Recommend physical therapy to build strength around joints, particularly hips, knees, and ankles. Encourage maintaining physical activity to manage symptoms. Suggest wearing supportive footwear with good arch support.    Postural Orthostatic Tachycardia Syndrome (POTS):  - Diagnosis of POTS confirmed. Symptoms include dizziness and fatigue, managed with hydration and salt intake.  - Continue current  management strategies. Monitor symptoms and adjust lifestyle as needed.    Plan:     Ordered at this visit:   No orders of the defined types were placed in this encounter.      Genetic counseling consultation with Nat Alcazar MS, [facility] to obtain a pedigree and for genetic counseling regarding exome reanalysis with consideration of additional testing if no new findings are noted.   Return to the Genetics Clinic for follow-up depending on the results of testing.      -----  History of Present Illness:  Patient Active Problem List   Diagnosis    Seasonal allergic rhinitis    Dry skin    Skin rash    Primary nocturnal enuresis    Nevus    Abdominal bloating    Class 1 obesity    Nocturnal enuresis    Adjustment disorder with anxious mood    Acanthosis nigricans    Ovarian failure    Primary amenorrhea    Constitutional tall stature    Panic attack    Anxiety    Attention deficit hyperactivity disorder, inattentive type    Overweight    ASHLEY (obstructive sleep apnea)    Patellofemoral pain syndrome of both knees    Dyspareunia in female    Pelvic floor dysfunction    Generalized anxiety disorder with panic attacks    Morbid (severe) obesity due to excess calories (H)    Raynaud's disease without gangrene    Postural dizziness with presyncope    Fatigue, unspecified type    Bruising    Skin picking habit    Moderate episode of recurrent major depressive disorder (H)    POTS (postural orthostatic tachycardia syndrome)    Intractable chronic migraine without aura and without status migrainosus       History of Present Illness-  Olena Gilmore, 20-year-old female    - Recently diagnosed with POTS, experiencing tiredness and occasional dizziness, improved with hydration and salt management.  - Concerns about hypermobility and joint pain, affecting daily activities like walking and gym workouts.  - Previous chromosome analysis showed 2X chromosome constitution, ruling out Ku syndrome as the cause for streak ovaries.  -  Initial genetic tests for disorders of sexual development and a more general gene test did not identify a cause for streak ovaries or tall stature.  - Joint pain primarily in ankles, shoulders, elbows, and fingers, with a lump on the pinky joint.  - Experiences constipation and loose stools, sometimes after eating.  - No specific injury reported for joint issues.    Review of available medical records:  The following is from my evaluation done1/20/2020:  Seen for primary ovarian failure:  Olena has been a generally healthy young lady.  Her mom became concerned when, as she began growing that she never got her period.  They also noted that although she had initiation of breast development, her breasts did not grow further.  Because of this, her primary provider did FSH testing and found this to be notably elevated.  This prompted referral to endocrinology for further assessment.    Currently, she is being treated with estrogen patches.  Genetic testing has been undertaken that is thus far negative, see below       Review of available medical records:    Seen in endocrinology for failure to initiate menstruation. Ultrasonography and MRI showed streak gonads and a prepubertal uterus. FSH was elevated. Noted to be tall for her age.     Has been followed in weight management clinic    10/22/19: seen by genetic counselor Maryann Breaux.  Olena was referred for genetic counseling and testing by her endocrine team. Obtained a panel of genes identified in ovarian failure  BMP15, BNC1, IAPH2, DMC1, ERCC6, ESR2, FANCM, FIGLA, FMR1, FOXL2, FSHR, HFM1, MCM8, MCM9, MRPS22, MSH5, NOBOX, NR5A1, FMB282, PATL2, POF1B, POLR2C, EYFO3CA, SOHLH1, SOX8, STAG3, SYCE1, TUBB8, WEE2, ZP1, JN5PUT4, BETTE, DCAF17, and HARS2.    Pertinent studies/abnormal test results:  Normal female karyotype 46, XX  Gene panel as noted above was negative with no significant variants identified    Imaging results:   US PELVIS COMPLETE WITHOUT TRANSVAGINAL    9/11/2019 9:33 AM       HISTORY: Primary amenorrhea; Ovarian failure.     COMPARISON: None     FINDINGS: Transabdominal imaging. The uterus is not identified. There  is no free fluid in the pelvis.     The right ovary measures 1.4 cm x 2.1 cm x 1.8 cm, volume =  2.8 cm3.  The left ovary was not identified. There is no adnexal mass.                                                                    IMPRESSION: No uterus identified. 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 Only the right ovary  was identified. The left ovary could be present but not seen, or could  be absent as part of the syndrome. Both kidneys are present by prior  ultrasound.     ROCAEL REID MD  ---  Exam: MR PELVIS (GYN) WO & W CONTRAST, 10/2/2019 3:58 PM     Indication: Malposition of uterus; Absent uterus and only one ovary  visualized on ultrasound, possible disorder of sex development;  Amenorrhea; Acquired premature ovarian failure; Absence of uterus     Comparison: Ultrasound from 9/11/2019.     Technique: Multiplanar and multisequence MRI of the pelvis was  obtained without and with intravenous contrast.  Contrast dose: 8 mL of Gadavist     Findings:   Pelvis: On series 8, sagittal T1 sequence, there is identification of  the vagina, cervix, and a small prepubertal uterus. There is  ill-defined linear tissue which extends outward from the uterus,  compatible with adnexal tissue, but no discrete ovarian tissue or  ovarian follicles are appreciated. No mass lesion or substantial free  fluid.     Bowel is nonobstructive and decompressed. Visualized portions of the  kidneys are normal. There is no suspicious adenopathy within the  pelvis. Rectum is decompressed.     Bones: No suspicious bony lesion.                                                                   Impression:  1. Identification of the vagina, cervix, and a prepubertal uterus.  2. Adnexa are linear and ill-defined without discrete ovarian  tissue.  Uncertain if this represents prepubertal state or streaky gonadal  tissue.     MARCO ANTONIO SIIDRO MD  --XR HAND BONE AGE  2019 9:36 AM     HISTORY: Primary amenorrhea; Ovarian failure     COMPARISON: None     FINDINGS:   The patient's chronologic age is 14 years 6 months.  The patient's bone age is in between 12 and 13 years.   Two standard deviations of the mean for a Female at this chronologic  age is 24 months.                                                                      IMPRESSION: Bone age near the lower limits of normal for chronologic  age.     ROCAEL REID MD    Pertinent studies/abnormal test results:   Trio exome done 2020:  no diagnostic findings    Imaging results: No new imaging beyond those noted, above    Past Medical History:  Pregnancy/ History:  See note from 2020    Past Medical History:   Diagnosis Date    GERD (gastroesophageal reflux disease)     Obesity, pediatric, BMI 85th to less than 95th percentile for age 2017     Hospitalization History:none since last evaluation    Surgical History:  None since last evaluation    Other health services currently received are primary care, endocrinology, genetic counseling, and physical therapy .       Review of Systems:  Constitional: tall stature  Eyes: negative - normal vision  Ears/Nose/Throat: negative - normal hearing  Respiratory: negative  Cardiovascular: POTS, rapid heart rate  Gastrointestinal: problems with both loose stools and constipation  Genitourinary: negative  Hematologic/Lymphatic: negative  Allergy/Immunologic: negative - no drug allergies  Musculoskeletal: popping joints and concerns about hypermobility - see above  Endocrine: negative  Integument: negative  Neurologic: negative  Psychiatric: negative    Remainder of comprehensive review of systems is complete and negative.    Personal History  Family History:    A detailed pedigree was obtained at the time of this appointment and is  available in the chart.  Family history is significant for ***.  Maternal ethnicity is ***.  Paternal ethnicity is ***.  Consanguinity {Consanguinity denied / reported:37895}.  Remainder of history was non-contributory.  Family History   Problem Relation Age of Onset    Bipolar Disorder Mother         also schizoaffective disorder, under care of  nghia    Other - See Comments Maternal Uncle         Possible blood clots, Mom thinks it may have to do with running marathons? She is checking into this and will MyChart message     Social History:  Lives with {Household:284924}  Current insurance status state/federal program (Medicaid/Medicare).     I have reviewed Olena s past medical history, family history, social history, medications and allergies as documented in the electronic medical record.  There were no additional findings except as noted.    Medications:  Current Outpatient Medications   Medication Sig Dispense Refill    azithromycin (ZITHROMAX) 250 MG tablet Two tablets first day, then one tablet daily for four days. 6 tablet 0    benzonatate (TESSALON) 100 MG capsule Take 1 capsule (100 mg) by mouth 3 times daily as needed for cough. 20 capsule 0    [START ON 4/30/2025] dexmethylphenidate (FOCALIN XR) 15 MG 24 hr capsule Take 1 capsule (15 mg) by mouth daily. 30 capsule 0    dexmethylphenidate (FOCALIN XR) 15 MG 24 hr capsule Take 1 capsule (15 mg) by mouth daily. 30 capsule 0    dexmethylphenidate (FOCALIN XR) 15 MG 24 hr capsule Take 1 capsule (15 mg) by mouth daily. 30 capsule 0    drospirenone-ethinyl estradiol (AGUSTIN) 3-0.02 MG tablet Take 1 tablet by mouth daily. 28 tablet 5    escitalopram (LEXAPRO) 20 MG tablet Take 1 tablet (20 mg) by mouth daily. 90 tablet 0    estradiol (ESTRACE) 2 MG tablet Take 1 tablet by mouth daily.      hydrOXYzine kp (VISTARIL) 25 MG capsule Take 1 capsule (25 mg) by mouth 3 times daily as needed for anxiety. 30 capsule 2    methylphenidate (RITALIN) 5 MG tablet Take 1-2  tablets (5-10 mg) by mouth daily as needed (ADHD). 60 tablet 0    naproxen sodium (ANAPROX) 220 MG tablet Take 3 tablets (660 mg) by mouth twice a day as needed (headache. Take with food. Limit to 4 days per week.).      ofloxacin (FLOXIN) 0.3 % otic solution Place 5 drops Into the left ear 2 times daily. 10 mL 0    prochlorperazine (COMPAZINE) 10 MG tablet Take 0.5-1 tablets (5-10 mg) by mouth every 8 hours as needed for nausea, vomiting or other (headache). 30 tablet 3    propranolol ER (INDERAL LA) 60 MG 24 hr capsule Take 1 capsule (60 mg) by mouth at bedtime 30 capsule 5    propranolol ER (INDERAL LA) 80 MG 24 hr capsule       ubrogepant (UBRELVY) 100 MG tablet Take 1 tablet (100 mg) by mouth at onset of headache (migraine. May repeat in 2 hours as needed. Max 2 tabs in 24 hours). 16 tablet 11    vitamin B-2 (RIBOFLAVIN) 100 MG TABS tablet Take 2 tablets (200 mg) by mouth 2 times daily. 120 tablet 11       Allergies:  Allergies   Allergen Reactions    Vancomycin Headache, Itching, Swelling and Unknown     Physical Examination:  not currently breastfeeding.  Weight %tile:Facility age limit for growth %lian is 20 years.  Height %tile: Facility age limit for growth %lian is 20 years.  Head Circumference %tile: Facility age limit for growth %lian is 20 years.  BMI %tile: No height and weight on file for this encounter.  *** Constitutional: This was a well-developed, well nourished female who responded appropriately to all requests during the examination.    Head and Neck:  She had hair of normal texture and distribution and her head was proportionate in appearance.  The face was symmetric and did not have dysmorphic features.   Eyes:  The pupils were equal, round, and reacted to light.   The conjunctivae were clear.   Ears:  Her ears were normal in architecture and placement.   Nose: The nose was clear.    Mouth and Throat: The throat was without erythema.    Respiratory: The chest was clear to auscultation and had  a symmetric appearance.    Cardiovascular:  On examination of the heart, the rhythm was regular and there was no murmur.  The peripheral pulses were normal.    Gastrointestinal: The abdomen was soft and had normal bowel sounds.  There was no hepatosplenomegaly.    : I deferred a  examination.   Musculoskeletal: There was a full range of motion on the extremity exam, and normal muscular volume and bulk.   Neurologic: The neurologic exam was normal, with normal cranial nerves, normal deep tendon reflexes, normal sensation, and a normal gait. She had normal tone.   Integument: The skin was normal with no rashes or unusual pigmentation. The dentition was normal. The nails on her hands were normal in architecture.  Her toenails are deep set and hyper convex..  She had normal dermatoglyphics.  Breasts are Cristobal stage 3    ---  Results of laboratory studies collected at this visit and available at the time of this note:  No results found for any visits on 04/29/25.    JUDY REAL M.D., FAAP, FAC  Professor   Division of Genetics and Metabolism  Department of Pediatrics  [facility]    Routed to family in [facility]  Also to  Lyndsey Meyer Bradley Scott Miller  ***      *** minutes spent on the date of the encounter doing chart review, history and exam, documentation and further activities per the note        Please do not hesitate to contact me if you have any questions/concerns.     Sincerely,       Judy Real MD

## 2025-04-29 NOTE — NURSING NOTE
"Chief Complaint   Patient presents with    Consult     NEW ADULT GENETIC-        Vitals:    04/29/25 1327   BP: 123/79   BP Location: Right arm   Patient Position: Sitting   Cuff Size: Adult Large   Pulse: 107   Resp: 20   SpO2: 99%   Weight: 240 lb 15.4 oz (109.3 kg)   Height: 5' 10.95\" (180.2 cm)   HC: 56 cm (22.05\")       Omar Lewis  April 29, 2025    "

## 2025-04-29 NOTE — PATIENT INSTRUCTIONS
Genetics  [facility]     Contact our nurse care coordinator Sagrario Arzola BSN, RN, PHN at [phone] or send a [employer] message for any non-urgent general or medical questions.     If you have further questions about genetic testing please contact your genetic counselor:  Nat Alcazar I Ph: [phone]    To schedule appointments:  Pediatric Call Center for [facility]: [phone]  Neuropsychology [phone]   Radiology/ Imaging/Echocardiogram: [phone]

## 2025-04-30 NOTE — PATIENT INSTRUCTIONS
Plan  1. Reanalysis of Whole Exome Sequencing performed at [email or web address] in 2020  2. Results expected in 10-12 weeks; I will call when available  3. If negative, Whole Genome Sequencing could be considered. This would require a GC visit with Olena and ideally both her parents to review and obtain consent.  4. Additional recommendations per Dr. Mackey     Please do not hesitate to reach out with any questions or concerns. It was a pleasure to participate in Olena's care today.       Nat Alcazar MS, [facility]  Genetic Counseling  [facility]  Email: [email or web address]  Phone: [phone]  Fax: [phone]

## 2025-04-30 NOTE — PROGRESS NOTES
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 Olena was here today with her mother. I met with the family at the request of Dr. Mackey to review prior genetic testing results and discuss and obtain consent for  additional testing (exome reanalysis).     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 She also has tall stature (>99th percentile) while her mid parental height predicted her height just below the 50th percentile.    Dr. Andre - Endocrinology 10/1/2024  Olena has had extensive work up done for primary amenorrhea and tall stature. The growth factors were at the lower end of the normal range. The adrenal and thyroid antibodies were negative, making an autoimmune process less likely to be a cause of the ovarian failure. The calcium and phosphorus were normal. The estradiol was low (prepubertal). The inhibin and anti-mullerian hormone were low (in the menopausal range), indicating that Olena didn't have an ovarian reserve. The uterus was not visible on ultrasound and only one ovary was seen. An MRI was done and the uterus was visualized but was reported to be prepubertal. The vagina and cervix were visualized. The ovaries were not identified but a streak tissue was seen.      - Neurology 3/13/2025  In summary, Olena Gilmore has chronic migraine, POTS visual snow, anxiety, depression, ADHD and ASHLEY untreated. There is a family history of migraine. The neurologic exam is notable for left pronator drift and new onset dizziness. Brain MRI, MRA head and neck were reassuringly normal. Headaches have significantly " improved.    Please see Dr. Mackey's note for interval history.  Patient Active Problem List   Diagnosis    Seasonal allergic rhinitis    Dry skin    Skin rash    Primary nocturnal enuresis    Nevus    Abdominal bloating    Class 1 obesity    Nocturnal enuresis    Adjustment disorder with anxious mood    Acanthosis nigricans    Ovarian failure    Primary amenorrhea    Constitutional tall stature    Panic attack    Anxiety    Attention deficit hyperactivity disorder, inattentive type    Overweight    ASHLEY (obstructive sleep apnea)    Patellofemoral pain syndrome of both knees    Dyspareunia in female    Pelvic floor dysfunction    Generalized anxiety disorder with panic attacks    Morbid (severe) obesity due to excess calories (H)    Raynaud's disease without gangrene    Postural dizziness with presyncope    Fatigue, unspecified type    Bruising    Skin picking habit    Moderate episode of recurrent major depressive disorder (H)    POTS (postural orthostatic tachycardia syndrome)    Intractable chronic migraine without aura and without status migrainosus      Past Surgical History:   Procedure Laterality Date    ADENOIDECTOMY  8/8/68     Previous Genetic Testing  Sex chromosome analysis (7/18/2019) [facility] - 46, XX (50 cells analyzes). Normal female.  Fragile X molecular analysis (11/5/2019) [facility] - 31 and 32 repeats, negative/normal.  Ovarian Dysgenesis Next Generation Sequencing Panel (11/21/2019) [facility] - Single uncertain variant in an autosomal recessive gene; essentially negative. (SOHLH1: [id number]; c.253G>A (p.Yeq14Hwx), heterozygous, exon 3, VUS)  Whole Exome Sequencing (trio) (2/28/2020) [facility] - Negative/normal. No secondary findings identified.    Family History:   A three generation pedigree was previously obtained by patient report and scanned into the EMR. There were no  "updates to family history information today.     Olena is the older of two children her parents have together.    She has a brother who had early signs of puberty but was \"underweight.\"  He has a history of Kawasaki disease.    Olena qureshi mother has mental health concerns, mild hearing loss diagnosed at age 16.  She has a history of polycystic ovaries and had approximately 18 months of infertility before conceiving Olena.  She had her first period at age 13.  Her height is 5'2\".  Olena qureshi maternal grandfather has a history of hearing loss as well as type 2 diabetes.  This man had a brother with possible infertility.    Olena qureshi father is 47-years-old  He has a history of hypertension, strabismus, and ichthyosis.  His height is 5'10\".   His maternal grandfather also had ichthyosis.    Olena qureshi has a paternal aunt who may have had some fertility concerns.    Olena qureshi paternal grandmother had early menarche.      Olena is of [place], [place], Arabic, and [place] ancestry on her maternal side and Arabic, [place], [place], and Polish ancestry on her paternal side.  Consanguinity was denied.           Discussion:  We spent time reviewing Olena's prior genetic testing results including a karyotype, Fragile X analysis, ovarian dysgenesis panel and Whole Exome Sequencing.    Karyotype - Negative/normal  A chromosomal karyotype is a genetic test that assesses the quantity and arrangement of a person's chromosomes. This testing can identify extra/missing whole chromosomes and can identify large rearrangements, duplications, and deletions. Karyotype does NOT detect all chromosome differences due resolution and technological limitations. Additional testing may be recommended to assess for smaller chromosome differences and/or other genetic variations. Olena had a karyotype that confirmed 46, XX chromosomes.    Fragile X analysis - Negative/normal  We reviewed that Fragile X Syndrome is a genetic condition caused by variants " "in the FMR1 gene.  Common symptoms of Fragile X Syndrome include developmental delays, learning disabilities, intellectual disability, and autism spectrum disorder. Fragile X Syndrome can affect both males and females.  However, males are typically more severely affected.  This is because the FMR1 gene is located on the X chromosome.  Biological females have 2 copies of the X chromosome and biological males have 1 copy of the X chromosome and 1 copy of the Y chromosome.  One way of thinking about this is that if a female has the variant causing Fragile X Syndrome on 1 X chromosome, we would expect her other X chromosome to be normal and function as a \"backup.\"  Biological males only have 1 copy of the X chromosome, so they would not have this \"backup\" copy.  The fact that the variant causing Fragile X Syndrome is on the X chromosome also has implications for how this condition runs through families.  Affected males inherit the expansion from a typically unaffected mother who has a \"premutation.\"  Some premutation carriers may have health concerns such as Fragile X-associated tremor ataxia syndrome and/or Fragile X-associated primary ovarian insufficiency.    Fragile X Syndrome is a repeat expansion disorder meaning that individuals' symptoms are due to extra genetic information in the form of repeats of genetic information. Specifically, this additional genetic material is in the form of CGG repeats that cause the FMR1 gene to not work properly.  Below are the types of results that we can obtain from genetic testing for Fragile X Syndrome:    Normal: 6-44 CGG repeats  Typical individual that does not have Fragile X Syndrome  Intermediate: 45-53 CGG repeats  Prone to expansion to premutation; no symptoms expected  Premutation:  CGG repeats  Carriers can have a child with Fragile X Syndrome and may have clinical findings (females may have premature ovarian insufficiency and both sexes may have Fragile X " associated tremor ataxia syndrome later in life)  Full Mutation: >200 CGG repeats  Indicates that the individual has Fragile X Syndrome    Olena was found to have 31 CGG repeats on one of their X chromosomes (normal range) and 32 CGG repeats on their other X chromosome (normal range). This means that Olena is NOT a premutation carrier for Fragile X, nor does she have Fragile X syndrome herself.    Ovarian Dysgenesis/Ovarian Failure  Next Generation Sequencing Gene Panel - uninformative. This panel included the following genes: BETTE, BMP15, BNC1, DCAF17, DIAPH2, DMC1, ERCC6, ESR2, FANCM, FIGLA, FMR1, FOXL2, FSHR, GDF9, HARS2, HFM1, MCM8, MCM9, MRPS22, MSH5, NOBOX, NR5A1, PCS353, PATL2, POF1B, POLR2C, FZPF1EO, SOHLH1, SOX8, STAG3, SYCE1, TUBB8, WEE2, ZP1, ZP3. This panel identified a variant of uncertain significance in SOHLH1. This gene is associated with autosomal recessive ovarian dysgenesis 5. While Olena s phenotype overlaps with this condition, her genotype does not. Having a heterozygous variant of uncertain significance is not consistent with a diagnosis of this condition. This is essentially a negative result.    Whole Exome Sequencing - Negative/normal.  Whole Exome Sequencing is a broad genetic test that assesses the coding regions (exons) of a person's genes to try to determine if there is an underlying genetic cause for their features/symptoms. This test included samples from Olena's mother and father as well. This testing was also negative:  Causative variant(s) in disease genes associated with reported phenotype: None Identified  Variant(s) in genes possibly associated with reported phenotype: None Identified  [facility] Secondary Findings: None Identified      We reviewed that this negative result does not rule out a genetic basis for the clinical features reported in this proband. It is possible that this proband has a pathogenic variant outside of the coding regions analyzed, or in a regulatory or deep  intronic region that would not be detected by exome sequencing. The clinical sensitivity of the exome sequencing depends in part on the proband's clinical phenotype.    Reanalysis  We discussed that we can submit for a reanalysis of the exome sequencing data at [facility] for no charge. Reanalysis of the data obtained from the original testing is improved by the updated algorithms and variant databases since it was first analyzed in 2020.     Olena elected to proceed with re-analysis at this time. We reviewed that this is no-charge for the first reanalysis and that additional sample(s) are not required, as it is just a reanalysis of the data from the original testing.    We reviewed [facility] Secondary Findings and discussed that the list of included genes as been updated since Olena's original testing. Previously, the family opted IN for Secondary findings for the proband and relatives. The family reaffirmed their desire to OPT IN for secondary findings for Olena and relatives for the reanalysis.    If Olena's reanalysis is negative, we could meet (GC only visit) to discuss the option of Whole Genome Sequencing which is a test that utilizes additional technologies (assessment of intronic variants for example) when analyzing a person's genetic information. Genome sequencing would also require samples from Olena's parents, who would need to attend that GC visit with her to give their consent. This visit can be telehealth or in person depending on the family's preference.    Plan  1. Reanalysis of Whole Exome Sequencing performed at [facility] in 2020  2. Results expected in 10-12 weeks; I will call when available  3. If negative, Whole Genome Sequencing could be considered. This would require a GC visit with Olena and ideally both her parents to review and obtain consent.  4. Additional recommendations per Dr. Mackey     Please do not hesitate to reach out with any questions or concerns. It was a pleasure to participate in  Olena's care today.       Nat Alcazar MS, Located within [facility]  Genetic Counseling  [facility]  Email: [email or web address]  Phone: [phone]  Fax: [phone]    40 minutes spent on the date of the encounter in chart review, patient visit, test coordination/review, documentation, and/or discussion with other providers about the issues documented above.     This visit was observed by genetic counseling student Sisi Porras.

## 2025-05-01 ENCOUNTER — VIRTUAL VISIT (OUTPATIENT)
Facility: CLINIC | Age: 20
End: 2025-05-01
Attending: PEDIATRICS
Payer: COMMERCIAL

## 2025-05-01 DIAGNOSIS — F33.1 MAJOR DEPRESSIVE DISORDER, RECURRENT EPISODE, MODERATE (H): ICD-10-CM

## 2025-05-01 DIAGNOSIS — F41.1 GENERALIZED ANXIETY DISORDER: Primary | ICD-10-CM

## 2025-05-01 DIAGNOSIS — F63.3 TRICHOTILLOMANIA: ICD-10-CM

## 2025-05-01 DIAGNOSIS — F42.4 EXCORIATION (SKIN-PICKING) DISORDER: ICD-10-CM

## 2025-05-01 PROBLEM — F43.22 ADJUSTMENT DISORDER WITH ANXIOUS MOOD: Status: RESOLVED | Noted: 2018-02-26 | Resolved: 2025-05-01

## 2025-05-01 ASSESSMENT — ANXIETY QUESTIONNAIRES
IF YOU CHECKED OFF ANY PROBLEMS ON THIS QUESTIONNAIRE, HOW DIFFICULT HAVE THESE PROBLEMS MADE IT FOR YOU TO DO YOUR WORK, TAKE CARE OF THINGS AT HOME, OR GET ALONG WITH OTHER PEOPLE: VERY DIFFICULT
GAD7 TOTAL SCORE: 15
1. FEELING NERVOUS, ANXIOUS, OR ON EDGE: MORE THAN HALF THE DAYS
GAD7 TOTAL SCORE: 15
GAD7 TOTAL SCORE: 15
7. FEELING AFRAID AS IF SOMETHING AWFUL MIGHT HAPPEN: MORE THAN HALF THE DAYS
2. NOT BEING ABLE TO STOP OR CONTROL WORRYING: MORE THAN HALF THE DAYS
8. IF YOU CHECKED OFF ANY PROBLEMS, HOW DIFFICULT HAVE THESE MADE IT FOR YOU TO DO YOUR WORK, TAKE CARE OF THINGS AT HOME, OR GET ALONG WITH OTHER PEOPLE?: VERY DIFFICULT
6. BECOMING EASILY ANNOYED OR IRRITABLE: NEARLY EVERY DAY
7. FEELING AFRAID AS IF SOMETHING AWFUL MIGHT HAPPEN: MORE THAN HALF THE DAYS
4. TROUBLE RELAXING: MORE THAN HALF THE DAYS
5. BEING SO RESTLESS THAT IT IS HARD TO SIT STILL: MORE THAN HALF THE DAYS
3. WORRYING TOO MUCH ABOUT DIFFERENT THINGS: MORE THAN HALF THE DAYS

## 2025-05-01 ASSESSMENT — PATIENT HEALTH QUESTIONNAIRE - PHQ9
10. IF YOU CHECKED OFF ANY PROBLEMS, HOW DIFFICULT HAVE THESE PROBLEMS MADE IT FOR YOU TO DO YOUR WORK, TAKE CARE OF THINGS AT HOME, OR GET ALONG WITH OTHER PEOPLE: SOMEWHAT DIFFICULT
SUM OF ALL RESPONSES TO PHQ QUESTIONS 1-9: 13
SUM OF ALL RESPONSES TO PHQ QUESTIONS 1-9: 13
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
## 2025-05-05 NOTE — PROGRESS NOTES
Assessment & Plan     Pelvic pain in female  We discussed her symptoms intermittently occurring the last several years and reviewed possible etiologies-including non-gynecologic. Wet prep was negative, will check pelvic ultrasound though patient only has streak gonads. Is already doing extended cycling on oral contraceptive pill. Discussed possibly keeping a food diary of recently eaten foods when symptoms are noted and we discussed possible follow up with primary care if imaging normal. Patient is given an opportunity to ask questions and have them answered.  - Wet preparation  - US Pelvic Transabdominal and Transvaginal; Future    Bacterial vaginosis  We had discussed treatment options during visit and patient requested Metrogel. Prescription sent and patient notified.   -metroNIDAZOLE (METROGEL) 0.75 % vaginal gel          Jaquan Hyatt is a 20 year old, presenting for the following health issues:  Pelvic Pain    HPI        Pelvic pain    Patient presents today with concerns of intermittent pelvic pain for the last few years. Occurs a few times a week and can last a few hours before resolving spontaneously without having to take anything.  Her history is significant for primary amenorrhea and subsequent diagnosis of primary ovarian insufficiency. Was on PO estradiol for 2 years, and about 2 years ago was changed over to a low dose combined oral contraceptive pill, continuously cycles on it.   Has had an extensive work up for this and recently underwent genome sequencing.  History of dyspareunia, but resolved with physical therapy. Currently, pain occurs randomly, no noted triggers that she has been able to identify. Location of pain can vary-lower pelvis, either side, medially, down to the cervix. Describes as sharp when present. Can be 3-4/10 but occasionally up to 7/10. No irregular bleeding with oral contraceptive pills, no bowel concerns-no constipation. Denies nausea, fevers, urinary symptoms when  "the symptoms are present. Can at times note abnormal vaginal discharge, itching, odor. Recent STI screening in January, declines need to repeat today.  Does not notice this to be food related. No recent new partners. Same pill for 2 years. POI thought to be autoimmune-XX Karyotype.       Review of Systems  Constitutional, HEENT, cardiovascular, pulmonary, gi and gu systems are negative, except as otherwise noted.      Objective    BP (!) 140/86 (BP Location: Right arm, Patient Position: Sitting, Cuff Size: Adult Large)   Pulse 109   Ht 1.802 m (5' 10.95\")   Wt 109.7 kg (241 lb 12.8 oz)   LMP  (LMP Unknown)   SpO2 97%   BMI 33.77 kg/m    Body mass index is 33.77 kg/m .  Physical Exam   GENERAL: alert and no distress   (female): normal female external genitalia, normal urethral meatus , normal vaginal mucosa-small amount of thicker vaginal discharge, and normal appearing cervix, no pain with bimanual exam, no palpable masses  MS: no gross musculoskeletal defects noted, no edema  SKIN: no suspicious lesions or rashes  PSYCH: mentation appears normal, affect normal/bright    Results for orders placed or performed in visit on 05/06/25 (from the past 24 hours)   Wet preparation    Specimen: Vagina; Swab   Result Value Ref Range    Trichomonas Absent Absent    Yeast Absent Absent    Clue Cells Present (A) Absent    WBCs/high power field 1+ (A) None           Signed Electronically by: MARYANN Davis CNP    "

## 2025-05-06 ENCOUNTER — OFFICE VISIT (OUTPATIENT)
Dept: OBGYN | Facility: CLINIC | Age: 20
End: 2025-05-06
Payer: COMMERCIAL

## 2025-05-06 VITALS
DIASTOLIC BLOOD PRESSURE: 86 MMHG | SYSTOLIC BLOOD PRESSURE: 140 MMHG | WEIGHT: 241.8 LBS | HEART RATE: 109 BPM | OXYGEN SATURATION: 97 % | HEIGHT: 71 IN | BODY MASS INDEX: 33.85 KG/M2

## 2025-05-06 DIAGNOSIS — B96.89 BACTERIAL VAGINOSIS: ICD-10-CM

## 2025-05-06 DIAGNOSIS — R10.2 PELVIC PAIN IN FEMALE: Primary | ICD-10-CM

## 2025-05-06 DIAGNOSIS — N76.0 BACTERIAL VAGINOSIS: ICD-10-CM

## 2025-05-06 LAB
CLUE CELLS: PRESENT
TRICHOMONAS, WET PREP: ABNORMAL
WBC'S/HIGH POWER FIELD, WET PREP: ABNORMAL
YEAST, WET PREP: ABNORMAL

## 2025-05-06 PROCEDURE — 87210 SMEAR WET MOUNT SALINE/INK: CPT | Performed by: NURSE PRACTITIONER

## 2025-05-06 PROCEDURE — 99214 OFFICE O/P EST MOD 30 MIN: CPT | Performed by: NURSE PRACTITIONER

## 2025-05-06 PROCEDURE — 99459 PELVIC EXAMINATION: CPT | Performed by: NURSE PRACTITIONER

## 2025-05-06 RX ORDER — METRONIDAZOLE 7.5 MG/G
1 GEL VAGINAL AT BEDTIME
Qty: 70 G | Refills: 0 | Status: SHIPPED | OUTPATIENT
Start: 2025-05-06 | End: 2025-05-11

## 2025-05-06 ASSESSMENT — PAIN SCALES - GENERAL: PAINLEVEL_OUTOF10: NO PAIN (0)

## 2025-05-06 NOTE — PATIENT INSTRUCTIONS
If you have any questions regarding your visit, Please contact your care team.     [facility]: [phone]  To Schedule an Appointment 24/7  Call: [phone]LEPHONE [facility]     Swati Marielos- APRN CNP      [facility]                   Monday 7:30am-2:00pm    Tuesday 7:30am-4:00pm    Wednesday 7:30am-2:00pm    Thursday 7:30am-11:00am    Friday 7:30am-2:00pm [address] [address]  Phone- [phone]   Fax- [phone]     Imaging Scheduling all locations  [phone]    [facility]  [address] [place] [address] [place] [address] [phone]         Urgent Care locations:  [facility]   Monday-Friday  10 am - 8 pm  Saturday and Sunday   9 am - 5 pm     [phone]   If you need a medication refill, please contact your pharmacy. Please allow 3 business days for your refill to be completed.  As always, Thank you for trusting us with your healthcare needs!      see additional instructions from your care team below

## 2025-05-13 ENCOUNTER — THERAPY VISIT (OUTPATIENT)
Dept: PHYSICAL THERAPY | Facility: CLINIC | Age: 20
End: 2025-05-13
Attending: PEDIATRICS
Payer: COMMERCIAL

## 2025-05-13 DIAGNOSIS — M35.7 HYPERMOBILITY SYNDROME: ICD-10-CM

## 2025-05-13 DIAGNOSIS — M25.50 MULTIPLE JOINT PAIN: ICD-10-CM

## 2025-05-13 PROCEDURE — 97161 PT EVAL LOW COMPLEX 20 MIN: CPT | Mod: GP

## 2025-05-13 NOTE — PROGRESS NOTES
"PHYSICAL THERAPY EVALUATION  Type of Visit: Evaluation       Fall Risk Screen:  Have you fallen 2 or more times in the past year?: No  Have you fallen and had an injury in the past year?: No    Subjective   Per chart review: \"history of POTS, raynaud's disease, pelvic floor dysfunction, ASHLEY, depression, anxiety, ovarian failure and migraines who presents with joint pain and stiffness. She complains of intermittent pain in multiple joints including ankles, shoulders, elbows, and finger joints.  She also has had chronic knee pain.  She experiences pain and fatigue when walking long distances.  She is currently attending the [employer] and struggles to get from class to class.  She also works in a .  She has been told she has hypermobile joints.  Her joints make clicking noises.  She does not believe she has ever dislocated any joints.  She has a nonpainful swelling on her R pinky.  Her joints sometimes look red and get itchy.  She has no history of rashes.  She bruises easily.\"    Olena arrives with her mom. She shares that she has some pain today from sleeping on her joints wrong- uses extra pillows to sleep. She shares that she has been told she is hypermobile by some doctors but one has also said that she is not hypermobile. Has been experiencing pain in most joints with the worse pain daily in the following joints: ankles, fingers, shoulder. Does get migraines a lot and experiences a lot of neck pain/tightness- shoulder pain when carrying things. She notes the feeling of needing to pop joints, hips and shoulders will pop along with knees- has used KT tape in the past for positioning, unsure if shoulders sublux as well. Tendons around ankles feel tight.   Has POTS as well, did an active stand test to diagnose in the past, takes propranolol to help with HR but does know if it is helping with anything else.   Does like to go to the gym- likes to do cardio (will get out of breath and HR will go way up) along with " some weights, was recently able to do squats for the first time.   Wondering about mobility aids, wondering if a walker would be helpful- originally tried a cane tat would help a little but then the other side of her body would hurt.     Notes sensitive and itchy skin as a kid, GI issues         Presenting condition or subjective complaint: joint pain, clicking,and popping  Date of onset: 04/17/25    Relevant medical history: Chest pain; Depression; Dizziness; Mental Illness; Migraines or headaches   Dates & types of surgery:      Prior diagnostic imaging/testing results: X-ray     Prior therapy history for the same diagnosis, illness or injury: Yes september through december 2023 - physical therapy    Living Environment  Social support: With family members   Type of home: House   Stairs to enter the home: Yes 3 Is there a railing: No     Ramp: No   Stairs inside the home: Yes 10 Is there a railing: Yes     Help at home: None  Equipment owned: Straight Cane     Employment: Yes   Hobbies/Interests: readibg,goibg to the gym    Patient goals for therapy: walking long distances without pain    Pain assessment: See above      Objective   STRENGTH: Will benefit from further strengthening to improve joint stability and functional mobility     TRANSFERS: Independent    GAIT: Ambulates with heel strike on lateral calcaneus, swing phase through collapsed midfoot with poor toe off. Knee hyperextension noted B but worse on R.     BALANCE: Not formally assessed, will assess further as needed    SPECIAL TESTS  6 Minute Walk Test (6MWT)   1099 feet   Olena reports L ankle pain right away with R ankle hurting soon after. Knees hurt along with LBP at 3 minutes. Reports hip and ankle joints feel unstable towards the end of the test      Did not require standing rest break     Beighton Hypermobility Scale    B elbow (2) 2   B knees (2) 2   B thumbs (2) 1    B small finger HE (2) 0   Bend and touch floor with flat palms (1)  0   Score: (0-9)  5           Generalized joint hypermobility identified by:  >=6 pre-pubertal children and adolescents  >=5 pubertal men and woman to age 50  >=4 men and women over the age of 50      Symptomatic pes planus? Yes will get pain on the bottom feet with standing and with walking. Preferring shoes that go up higher for ankle support. When barefoot, demonstrates midfoot collapse in weightbearing and gait.   Hypermobility noted in hindfoot and midfoot with palpation     Gambell Impact of Hypermobility Questionnaire: 209/360 (58%)    Assessment & Plan   CLINICAL IMPRESSIONS  Medical Diagnosis: Multiple joint pain (M25.50), Hypermobility syndrome (M35.7)    Treatment Diagnosis: force production deficit, sensory selection and weighting deficit   Impression/Assessment: Patient is a 20 year old female with pain and instability secondary to hypermobility.  The following significant findings have been identified: Pain, Decreased ROM/flexibility, Decreased strength, Impaired balance, Decreased proprioception, Impaired muscle performance, Decreased activity tolerance, Impaired posture, and Instability. These impairments interfere with their ability to perform self care tasks, recreational activities, household chores, household mobility, and community mobility as compared to previous level of function.     Clinical Decision Making (Complexity):  Clinical Presentation: Stable/Uncomplicated  Clinical Presentation Rationale: based on medical and personal factors listed in PT evaluation  Clinical Decision Making (Complexity): Low complexity    PLAN OF CARE  Treatment Interventions:  Interventions: Gait Training, Manual Therapy, Neuromuscular Re-education, Therapeutic Activity, Therapeutic Exercise, Self-Care/Home Management, Standardized Testing    Long Term Goals     PT Goal 1  Goal Identifier: HEP  Goal Description: Pt will report understanding in exercise progressions and willingness to perform exercises following  PT POC for continued strengthening and independence with fitness goals.  Rationale: to maximize safety and independence with performance of ADLs and functional tasks  Target Date: 07/22/25  PT Goal 2  Goal Identifier: Hypermobility Impact  Goal Description: Pt will demonstrate an 15% reduction on the BIoH questionnaire to demonstrate decreased impact resulting in ability to perform ADLs independently.  Rationale: to maximize safety and independence with performance of ADLs and functional tasks  Target Date: 07/22/25  PT Goal 3  Goal Identifier: Pain  Goal Description: Pt will report 2/10 or less pain with all functional activities for 2 weeks in a row to demonstrate improved strength and joint stability to allow for improved activity tolerance  Rationale: to maximize safety and independence with performance of ADLs and functional tasks  Target Date: 07/22/25      Frequency of Treatment: 1x/week  Duration of Treatment: 10 weeks    Recommended Referrals to Other Professionals: None at this time  Education Assessment:   Learner/Method: Patient;Listening;Demonstration;Pictures/Video    Risks and benefits of evaluation/treatment have been explained.   Patient/Family/caregiver agrees with Plan of Care.     Evaluation Time:     PT Eval, Low Complexity Minutes (04330): 45     Signing Clinician: Lashonda Onofre, PT        [facility]                                                                                   OUTPATIENT PHYSICAL THERAPY      PLAN OF TREATMENT FOR OUTPATIENT REHABILITATION   Patient's Last Name, First Name, Olena Jones YOB: 2005   Provider's Name   [facility]   Medical Record No.  [id number]     Onset Date: 04/17/25  Start of Care Date: 05/13/25     Medical Diagnosis:  Multiple joint pain (M25.50), Hypermobility syndrome (M35.7)    PT Treatment Diagnosis:  force production deficit, sensory selection and weighting  deficit Plan of Treatment  Frequency/Duration: 1x/week/ 10 weeks    Certification date from 05/13/25 to 07/22/25         See note for plan of treatment details and functional goals     Lashonda Onofre, PT                         I CERTIFY THE NEED FOR THESE SERVICES FURNISHED UNDER        THIS PLAN OF TREATMENT AND WHILE UNDER MY CARE     (Physician attestation of this document indicates review and certification of the therapy plan).              Referring Provider:  Lyndsey Meyer    Initial Assessment  See Epic Evaluation- Start of Care Date: 05/13/25

## 2025-05-14 ENCOUNTER — OFFICE VISIT (OUTPATIENT)
Dept: RHEUMATOLOGY | Facility: CLINIC | Age: 20
End: 2025-05-14
Attending: PEDIATRICS
Payer: COMMERCIAL

## 2025-05-14 VITALS
WEIGHT: 242 LBS | SYSTOLIC BLOOD PRESSURE: 104 MMHG | DIASTOLIC BLOOD PRESSURE: 80 MMHG | HEART RATE: 84 BPM | OXYGEN SATURATION: 98 % | BODY MASS INDEX: 33.8 KG/M2

## 2025-05-14 DIAGNOSIS — M35.7 HYPERMOBILITY SYNDROME: Primary | ICD-10-CM

## 2025-05-14 DIAGNOSIS — M25.50 MULTIPLE JOINT PAIN: ICD-10-CM

## 2025-05-14 ASSESSMENT — PAIN SCALES - GENERAL: PAINLEVEL_OUTOF10: MODERATE PAIN (5)

## 2025-05-14 NOTE — PROGRESS NOTES
Rheumatology Outpatient Consult Note    DATE OF SERVICE: 5/14/2025    REQUESTING PHYSICIAN:  Lyndsey Meyer      CHIEF COMPLAINT:  Chief Complaint   Patient presents with    Consult      Multiple joint pain  Hypermobility syndrome        New Patient     Referred by: Lyndsey Meyer MD       Olena is a 20 year old patient who presents today to the  Rheumatology Clinic at the request of Lyndsey Meyer  for consultation of hypermobility of joints .    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   has a past surgical history that includes adenoidectomy (8/8/68).    Patient reports here for evaluation of joint hypermobility.  Patient reports she has a history of longtime bilateral knee pain for the past 10 years.  She had been seen with physical therapy for this and was diagnosed with hypermobility of her joints.  Recently her pain has also increased to affect her ankles and fingers in the past year.  She feels that everything pops and clicks.  She has found it increasingly difficult to walk to class due to her ankle and knee pain.  She had met with physical therapy yesterday and it was discussed that she may have use of mobility aids such as crutches or a walker.  She admits her pain is worse with certain movements and with more activity.  She denies any a.m. stiffness of her joints or swelling.  She also admits to having pressure points of her hips and shoulders when she lies down to sleep.  She also has some history of easy bruising and skin sensitivity since she was a child.  Of note she was also diagnosed with POTS syndrome as care with neurology for this, was started on propranolol.    I have reviewed medical records and performed history and  exam.    PAST MEDICAL HISTORY  Past Medical History:   Diagnosis Date    ADHD (attention deficit hyperactivity disorder)     GERD (gastroesophageal reflux disease)     hypermobility     Obesity, pediatric, BMI 85th to less than 95th percentile for age 09/18/2017    Premature ovarian failure     reviewed  Past Surgical History:   Procedure Laterality Date    ADENOIDECTOMY  8/8/68    reviewed    MEDICATIONS  Current Outpatient Medications   Medication Sig Dispense Refill    dexmethylphenidate (FOCALIN XR) 15 MG 24 hr capsule Take 1 capsule (15 mg) by mouth daily. 30 capsule 0    drospirenone-ethinyl estradiol (AGUSTIN) 3-0.02 MG tablet Take 1 tablet by mouth daily. 28 tablet 5    escitalopram (LEXAPRO) 20 MG tablet Take 1 tablet (20 mg) by mouth daily. 90 tablet 0    hydrOXYzine kp (VISTARIL) 25 MG capsule Take 1 capsule (25 mg) by mouth 3 times daily as needed for anxiety. 30 capsule 2    methylphenidate (RITALIN) 5 MG tablet Take 1-2 tablets (5-10 mg) by mouth daily as needed (ADHD). 60 tablet 0    naproxen sodium (ANAPROX) 220 MG tablet Take 3 tablets (660 mg) by mouth twice a day as needed (headache. Take with food. Limit to 4 days per week.).      prochlorperazine (COMPAZINE) 10 MG tablet Take 0.5-1 tablets (5-10 mg) by mouth every 8 hours as needed for nausea, vomiting or other (headache). 30 tablet 3    propranolol ER (INDERAL LA) 80 MG 24 hr capsule       ubrogepant (UBRELVY) 100 MG tablet Take 1 tablet (100 mg) by mouth at onset of headache (migraine. May repeat in 2 hours as needed. Max 2 tabs in 24 hours). 16 tablet 11    vitamin B-2 (RIBOFLAVIN) 100 MG TABS tablet Take 2 tablets (200 mg) by mouth 2 times daily. 120 tablet 11       ALLERGIES  Allergies   Allergen Reactions    Vancomycin Headache, Itching, Swelling and Unknown       SOCIAL HISTORY  Social History     Socioeconomic History    Marital status: Single     Spouse name: Not on file    Number of children: Not on file    Years of education: Not on file     Highest education level: Not on file   Occupational History    Not on file   Tobacco Use    Smoking status: Never     Passive exposure: Never    Smokeless tobacco: Never    Tobacco comments:     No second hand smoke exposure.    Vaping Use    Vaping status: Never Used   Substance and Sexual Activity    Alcohol use: No    Drug use: No    Sexual activity: Yes     Partners: Male     Birth control/protection: OCP   Other Topics Concern    Not on file   Social History Narrative    Not on file     Social Drivers of Health     Financial Resource Strain: Not on file   Food Insecurity: Low Risk  (11/15/2024)    Food Insecurity     Within the past 12 months, did you worry that your food would run out before you got money to buy more?: No     Within the past 12 months, did the food you bought just not last and you didn t have money to get more?: No   Transportation Needs: Low Risk  (11/15/2024)    Transportation Needs     Within the past 12 months, has lack of transportation kept you from medical appointments, getting your medicines, non-medical meetings or appointments, work, or from getting things that you need?: No   Physical Activity: Insufficiently Active (11/15/2024)    Exercise Vital Sign     Days of Exercise per Week: 1 day     Minutes of Exercise per Session: 20 min   Stress: Not on file   Social Connections: Not on file   Interpersonal Safety: Low Risk  (12/20/2024)    Interpersonal Safety     Do you feel physically and emotionally safe where you currently live?: Yes     Within the past 12 months, have you been hit, slapped, kicked or otherwise physically hurt by someone?: No     Within the past 12 months, have you been humiliated or emotionally abused in other ways by your partner or ex-partner?: No   Housing Stability: Low Risk  (11/15/2024)    Housing Stability     Do you have housing? : Yes     Are you worried about losing your housing?: No    reviewed    FAMILY HISTORY  Family History   Problem Relation Age  of Onset    Bipolar Disorder Mother         also schizoaffective disorder, under care of  nghia    Other - See Comments Maternal Uncle         Possible blood clots, Mom thinks it may have to do with running marathons? She is checking into this and will MyChart message   : reviewed .    No history of autoimmune diseases, RA, SLE, Scleroderma, OP.         Objective   PHYSICAL EXAMINATION  /80   Pulse 84   Wt 109.8 kg (242 lb)   LMP  (LMP Unknown)   SpO2 98%   BMI 33.80 kg/m  : reviewed    Physical Exam    GENERAL: Pleasant and cooperative, in no distress. Alert and Oriented x 3.  SKIN: No rashes, lesions. No psoriasis. No Raynaud's.  HEENT: Normocephalic, atraumatic. Moist mucosal membranes without  ulcerations.  LYMPHATICS: No cervical or supraclavicular lymph nodes enlargement.  CARDIOVASCULAR: RR  PULMONARY: No accessory muscle use, no wheeze, cough or audible rales  ABDOMEN: Soft, non-distended    MUSCULOSKELETAL:  Shoulders: Normal ROM.  Elbows: Bilateral elbows with hypermobility.  No tophi or rheumatoid  nodules.  Wrists:  No Tenosynovitis dorsum of wrist. Full ROM.  Hands:  No Synovitis noted MCP, PIP joints. Good .  Hips: Negative MANUEL b/l No pain over trochanteric bursas.  Knees: No crepitus.  Bilateral knees with hypermobility, no swelling or effusion, or bulge sign.  Ankles:  No swelling. Normal achilles tendon. No pain over plantar fascia.  Feet: No pain on metatarsal .  Back: No scoliosis,  normal spine flexion, extension, rotation, lateral bending.  Normal chest expansion.  Neuro: No focal deficits  Gait: Normal, requires no assistive device.    LABS: Past labs reviewed and discussed with the patient.        Latest Ref Rng & Units 1/23/2025     9:55 AM 3/30/2025    11:16 PM 4/17/2025    11:38 AM   RHEUM RESULTS   Albumin 3.5 - 5.2 g/dL 4.6  4.5     ALT 0 - 50 U/L 17  23     AST 0 - 45 U/L 19  21     CUCO interpretation Negative   Negative    CK Total 26 - 192 U/L  92     Creatinine  0.51 - 0.95 mg/dL 1.00  0.78     CRP Inflammation <5.00 mg/L   <3.00    GFR Estimate >60 mL/min/1.73m2 83  >90     Hematocrit 35.0 - 47.0 %  41.1     Hemoglobin 11.7 - 15.7 g/dL  13.2     WBC 4.0 - 11.0 10e3/uL  8.1     RBC Count 3.80 - 5.20 10e6/uL  4.66     RDW 10.0 - 15.0 %  12.5     MCHC 31.5 - 36.5 g/dL  32.1     MCV 78 - 100 fL  88     Platelet Count 150 - 450 10e3/uL  340     Sed Rate 0 - 20 mm/hr   7        Last three Labs:  Hemoglobin   Date Value Ref Range Status   03/30/2025 13.2 11.7 - 15.7 g/dL Final   01/20/2025 13.2 11.7 - 15.7 g/dL Final   11/15/2024 12.4 11.7 - 15.7 g/dL Final   07/08/2021 12.8 11.7 - 15.7 g/dL Final   07/06/2020 12.8 11.7 - 15.7 g/dL Final   04/12/2019 13.3 11.7 - 15.7 g/dL Final     Urea Nitrogen   Date Value Ref Range Status   03/30/2025 8.8 6.0 - 20.0 mg/dL Final   01/23/2025 14.8 6.0 - 20.0 mg/dL Final   01/20/2025 14.4 6.0 - 20.0 mg/dL Final   02/25/2022 8 7 - 19 mg/dL Final   08/27/2021 9 7 - 19 mg/dL Final   04/30/2021 15 7 - 19 mg/dL Final   07/17/2017 15 7 - 19 mg/dL Final     Sed Rate   Date Value Ref Range Status   09/18/2017 5 0 - 15 mm/h Final     Erythrocyte Sedimentation Rate   Date Value Ref Range Status   04/17/2025 7 0 - 20 mm/hr Final     CRP Inflammation   Date Value Ref Range Status   09/18/2017 <2.9 0.0 - 8.0 mg/L Final     AST   Date Value Ref Range Status   03/30/2025 21 0 - 45 U/L Final   01/23/2025 19 0 - 35 U/L Final   01/20/2025 21 0 - 35 U/L Final   07/08/2021 26 0 - 35 U/L Final   04/30/2021 12 0 - 35 U/L Final   07/06/2020 15 0 - 35 U/L Final     Albumin   Date Value Ref Range Status   03/30/2025 4.5 3.5 - 5.2 g/dL Final   01/23/2025 4.6 3.5 - 5.2 g/dL Final   01/20/2025 4.8 3.5 - 5.2 g/dL Final   02/25/2022 4.4 3.4 - 5.0 g/dL Final   11/29/2021 4.4 3.4 - 5.0 g/dL Final   08/27/2021 4.1 3.4 - 5.0 g/dL Final   07/08/2021 4.1 3.4 - 5.0 g/dL Final   07/06/2020 4.3 3.4 - 5.0 g/dL Final   07/17/2017 4.4 3.4 - 5.0 g/dL Final     Alkaline Phosphatase   Date  Value Ref Range Status   03/30/2025 75 40 - 150 U/L Final   01/23/2025 67 40 - 150 U/L Final   01/20/2025 70 40 - 150 U/L Final   07/08/2021 128 40 - 150 U/L Final   07/06/2020 165 70 - 230 U/L Final   07/17/2017 328 105 - 420 U/L Final     ALT   Date Value Ref Range Status   03/30/2025 23 0 - 50 U/L Final   01/23/2025 17 0 - 50 U/L Final   01/20/2025 15 0 - 50 U/L Final   07/08/2021 61 (H) 0 - 50 U/L Final   04/30/2021 34 0 - 50 U/L Final   07/06/2020 24 0 - 50 U/L Final         IMAGING: Reviewed      ASSESSMENT:  1. Hypermobility syndrome    2. Multiple joint pain        PLAN:    (M25.50) Multiple joint pain  Comment: Due to benign hypermobility syndrome. Per patient tested negative for EDS genetic screening.   Plan: Work with PT:  Strengthening: Focus on core stability and joint-stabilizing muscles  Proprioception training: To improve joint control  Low-impact exercises: Swimming, Pilates, cycling  Avoid high-impact sports or activities that stress unstable joints  Joint bracing or taping techniques      (M35.7) Hypermobility syndrome  Comment: Noted exam findings at b/l elbows and knees, educated patient on understanding the condition: lifelong but manageable and working with PT for joint protection strategies, emphasizing activity modification, not avoidance.  Plan:   -Pain Management with NSAIDs (e.g., ibuprofen, naproxen) for short-term relief, Topical analgesics (capsaicin, lidocaine)  -Referral to pain management if severe  -Regular, gentle exercise  -Adequate hydration and nutrition (consider magnesium, vitamin D, omega-3)  -Avoid prolonged static postures  -Consider compression garments for proprioceptive feedback and POTS symptoms      Follow up as needed    Thank you very much for allowing me to participate in the care of  Olean Please call with any questions.    35 minutes were spent reviewing the patient s chart, evaluating and counseling the patient, and coordinating care.     MD MONICA Ding  [facility]

## 2025-05-19 ENCOUNTER — THERAPY VISIT (OUTPATIENT)
Dept: PHYSICAL THERAPY | Facility: CLINIC | Age: 20
End: 2025-05-19
Payer: COMMERCIAL

## 2025-05-19 DIAGNOSIS — M35.7 HYPERMOBILITY SYNDROME: Primary | ICD-10-CM

## 2025-05-19 DIAGNOSIS — Z74.09 IMPAIRED MOBILITY: ICD-10-CM

## 2025-05-19 DIAGNOSIS — M25.50 MULTIPLE JOINT PAIN: ICD-10-CM

## 2025-05-19 PROCEDURE — 97116 GAIT TRAINING THERAPY: CPT | Mod: GP

## 2025-05-19 PROCEDURE — 97535 SELF CARE MNGMENT TRAINING: CPT | Mod: GP

## 2025-05-19 NOTE — PROGRESS NOTES
DME (Durable Medical Equipment) Orders and Documentation  Orders Placed This Encounter   Procedures    Crutches Order      The patient was assessed and it was determined the patient is in need of the following listed DME Supplies/Equipment. Please complete supporting documentation below to demonstrate medical necessity.      DME All Other Item(s) Documentation    List reason for need and supporting documentation for medical necessity below for each DME item.     1. Hypermobility syndrome and fatigue    Electronically signed by:  Lyndsey Meyer MD

## 2025-05-29 ENCOUNTER — THERAPY VISIT (OUTPATIENT)
Dept: PHYSICAL THERAPY | Facility: CLINIC | Age: 20
End: 2025-05-29
Payer: COMMERCIAL

## 2025-05-29 DIAGNOSIS — M25.50 MULTIPLE JOINT PAIN: ICD-10-CM

## 2025-05-29 DIAGNOSIS — M35.7 HYPERMOBILITY SYNDROME: Primary | ICD-10-CM

## 2025-05-29 PROCEDURE — 97110 THERAPEUTIC EXERCISES: CPT | Mod: GP

## 2025-06-03 ENCOUNTER — THERAPY VISIT (OUTPATIENT)
Dept: PHYSICAL THERAPY | Facility: CLINIC | Age: 20
End: 2025-06-03
Payer: COMMERCIAL

## 2025-06-03 DIAGNOSIS — M25.50 MULTIPLE JOINT PAIN: ICD-10-CM

## 2025-06-03 DIAGNOSIS — M35.7 HYPERMOBILITY SYNDROME: Primary | ICD-10-CM

## 2025-06-03 PROCEDURE — 97140 MANUAL THERAPY 1/> REGIONS: CPT | Mod: GP

## 2025-06-03 PROCEDURE — 97110 THERAPEUTIC EXERCISES: CPT | Mod: GP

## 2025-06-05 NOTE — DISCHARGE INSTRUCTIONS
Thank you for choosing [facility] for your care. It was a pleasure taking care of you today in our Emergency Department.       Instructions from your doctor today:  Emergency Department (ED) testing is focused on the potential causes of your symptoms that are the most dangerous possibilities, and cannot cover every possibility. Based on the evaluation, it was deemed sufficiently safe to discharge and continue management through the clinics. Thus, follow-up is very important to assess for improvement/worsening, potential further testing, and potential treatment adjustments. If you were given opioid pain medications or other medications that can make you drowsy while in the ED, you should not drive for at least several hours and not until you feel completely back to normal.     Please make an appointment to follow up with:  - Your Primary Care Provider in 2-4 days  - If you do not have a primary care provider, you can be seen in follow-up and establish care by calling any of the clinics below:     - [facility] (phone: [phone])     - [facility] (phone: [phone])   - Have your clinic provider review the results from today's visit with you again, including any potential follow-up or additional testing that may be needed based on the results. Occasionally, incidental findings are found on later review by radiologists that may need follow-up.       If you have continued worsening symptoms, please return to the emergency department.     
no

## 2025-06-07 ENCOUNTER — APPOINTMENT (OUTPATIENT)
Dept: GENERAL RADIOLOGY | Facility: CLINIC | Age: 20
End: 2025-06-07
Attending: EMERGENCY MEDICINE
Payer: COMMERCIAL

## 2025-06-07 ENCOUNTER — HOSPITAL ENCOUNTER (EMERGENCY)
Facility: CLINIC | Age: 20
Discharge: HOME | End: 2025-06-07
Attending: EMERGENCY MEDICINE | Admitting: EMERGENCY MEDICINE
Payer: COMMERCIAL

## 2025-06-07 VITALS
SYSTOLIC BLOOD PRESSURE: 137 MMHG | HEIGHT: 72 IN | WEIGHT: 245 LBS | BODY MASS INDEX: 33.18 KG/M2 | HEART RATE: 55 BPM | RESPIRATION RATE: 18 BRPM | TEMPERATURE: 98.1 F | OXYGEN SATURATION: 100 % | DIASTOLIC BLOOD PRESSURE: 69 MMHG

## 2025-06-07 DIAGNOSIS — M25.551 HIP PAIN, RIGHT: ICD-10-CM

## 2025-06-07 LAB
ALBUMIN SERPL BCG-MCNC: 4.3 G/DL (ref 3.5–5.2)
ALBUMIN UR-MCNC: 10 MG/DL
ALP SERPL-CCNC: 66 U/L (ref 40–150)
ALT SERPL W P-5'-P-CCNC: 18 U/L (ref 0–50)
ANION GAP SERPL CALCULATED.3IONS-SCNC: 10 MMOL/L (ref 7–15)
APPEARANCE UR: CLEAR
AST SERPL W P-5'-P-CCNC: 21 U/L (ref 0–45)
BASOPHILS # BLD AUTO: 0.1 10E3/UL (ref 0–0.2)
BASOPHILS NFR BLD AUTO: 1 %
BILIRUB SERPL-MCNC: 0.2 MG/DL
BILIRUB UR QL STRIP: NEGATIVE
BUN SERPL-MCNC: 15.7 MG/DL (ref 6–20)
CALCIUM SERPL-MCNC: 9.1 MG/DL (ref 8.8–10.4)
CHLORIDE SERPL-SCNC: 110 MMOL/L (ref 98–107)
COLOR UR AUTO: YELLOW
CREAT SERPL-MCNC: 0.97 MG/DL (ref 0.51–0.95)
EGFRCR SERPLBLD CKD-EPI 2021: 85 ML/MIN/1.73M2
EOSINOPHIL # BLD AUTO: 0.1 10E3/UL (ref 0–0.7)
EOSINOPHIL NFR BLD AUTO: 2 %
ERYTHROCYTE [DISTWIDTH] IN BLOOD BY AUTOMATED COUNT: 13.5 % (ref 10–15)
GLUCOSE SERPL-MCNC: 96 MG/DL (ref 70–99)
GLUCOSE UR STRIP-MCNC: NEGATIVE MG/DL
HCG SERPL QL: NEGATIVE
HCO3 SERPL-SCNC: 22 MMOL/L (ref 22–29)
HCT VFR BLD AUTO: 35.8 % (ref 35–47)
HGB BLD-MCNC: 11.5 G/DL (ref 11.7–15.7)
HGB UR QL STRIP: NEGATIVE
IMM GRANULOCYTES # BLD: 0 10E3/UL
IMM GRANULOCYTES NFR BLD: 0 %
KETONES UR STRIP-MCNC: NEGATIVE MG/DL
LEUKOCYTE ESTERASE UR QL STRIP: NEGATIVE
LYMPHOCYTES # BLD AUTO: 2 10E3/UL (ref 0.8–5.3)
LYMPHOCYTES NFR BLD AUTO: 35 %
MCH RBC QN AUTO: 27.5 PG (ref 26.5–33)
MCHC RBC AUTO-ENTMCNC: 32.1 G/DL (ref 31.5–36.5)
MCV RBC AUTO: 86 FL (ref 78–100)
MONOCYTES # BLD AUTO: 0.5 10E3/UL (ref 0–1.3)
MONOCYTES NFR BLD AUTO: 8 %
MUCOUS THREADS #/AREA URNS LPF: PRESENT /LPF
NEUTROPHILS # BLD AUTO: 3.1 10E3/UL (ref 1.6–8.3)
NEUTROPHILS NFR BLD AUTO: 53 %
NITRATE UR QL: NEGATIVE
NRBC # BLD AUTO: 0 10E3/UL
NRBC BLD AUTO-RTO: 0 /100
PH UR STRIP: 7 [PH] (ref 5–7)
PLATELET # BLD AUTO: 303 10E3/UL (ref 150–450)
POTASSIUM SERPL-SCNC: 4.1 MMOL/L (ref 3.4–5.3)
PROT SERPL-MCNC: 7 G/DL (ref 6.4–8.3)
RBC # BLD AUTO: 4.18 10E6/UL (ref 3.8–5.2)
RBC URINE: <1 /HPF
SODIUM SERPL-SCNC: 142 MMOL/L (ref 135–145)
SP GR UR STRIP: 1.03 (ref 1–1.03)
SQUAMOUS EPITHELIAL: 2 /HPF
UROBILINOGEN UR STRIP-MCNC: 3 MG/DL
WBC # BLD AUTO: 5.8 10E3/UL (ref 4–11)
WBC URINE: 1 /HPF

## 2025-06-07 PROCEDURE — 73502 X-RAY EXAM HIP UNI 2-3 VIEWS: CPT

## 2025-06-07 PROCEDURE — 84703 CHORIONIC GONADOTROPIN ASSAY: CPT | Performed by: EMERGENCY MEDICINE

## 2025-06-07 PROCEDURE — 81001 URINALYSIS AUTO W/SCOPE: CPT | Performed by: EMERGENCY MEDICINE

## 2025-06-07 PROCEDURE — 85025 COMPLETE CBC W/AUTO DIFF WBC: CPT | Performed by: EMERGENCY MEDICINE

## 2025-06-07 PROCEDURE — 82040 ASSAY OF SERUM ALBUMIN: CPT | Performed by: EMERGENCY MEDICINE

## 2025-06-07 PROCEDURE — 99284 EMERGENCY DEPT VISIT MOD MDM: CPT | Performed by: EMERGENCY MEDICINE

## 2025-06-07 PROCEDURE — 250N000013 HC RX MED GY IP 250 OP 250 PS 637: Performed by: EMERGENCY MEDICINE

## 2025-06-07 PROCEDURE — 73502 X-RAY EXAM HIP UNI 2-3 VIEWS: CPT | Mod: 26 | Performed by: RADIOLOGY

## 2025-06-07 PROCEDURE — 36415 COLL VENOUS BLD VENIPUNCTURE: CPT | Performed by: EMERGENCY MEDICINE

## 2025-06-07 RX ORDER — HYDROCODONE BITARTRATE AND ACETAMINOPHEN 5; 325 MG/1; MG/1
1 TABLET ORAL ONCE
Refills: 0 | Status: COMPLETED | OUTPATIENT
Start: 2025-06-07 | End: 2025-06-07

## 2025-06-07 RX ADMIN — HYDROCODONE BITARTRATE AND ACETAMINOPHEN 1 TABLET: 5; 325 TABLET ORAL at 22:33

## 2025-06-07 ASSESSMENT — COLUMBIA-SUICIDE SEVERITY RATING SCALE - C-SSRS
1. IN THE PAST MONTH, HAVE YOU WISHED YOU WERE DEAD OR WISHED YOU COULD GO TO SLEEP AND NOT WAKE UP?: NO
6. HAVE YOU EVER DONE ANYTHING, STARTED TO DO ANYTHING, OR PREPARED TO DO ANYTHING TO END YOUR LIFE?: NO
2. HAVE YOU ACTUALLY HAD ANY THOUGHTS OF KILLING YOURSELF IN THE PAST MONTH?: NO

## 2025-06-07 ASSESSMENT — ACTIVITIES OF DAILY LIVING (ADL)
ADLS_ACUITY_SCORE: 41

## 2025-06-08 NOTE — DISCHARGE INSTRUCTIONS
Please make an appointment to follow up with Your Primary Care Provider in 3-5 days as needed.    Use acetaminophen 1000 mg every 6 hours or ibuprofen 600 mg every 6 hours as needed for discomfort.    If you have worsening symptoms including increasing pain, fevers, numbness or weakness in your leg or other concerns return to the emergency department for reevaluation.

## 2025-06-08 NOTE — ED TRIAGE NOTES
Patient to triage with c/o right sided hip pain. Patient stated she woke up feeling sharp pain on her right hip and thinks it might be dislocated. Patient has hx of multiple hip dislocations. Patient reports she has hypermobility syndrome. Patient took Naproxen with no relief.      Triage Assessment (Adult)       Row Name 06/07/25 [id number]          Triage Assessment    Airway WDL WDL        Respiratory WDL    Respiratory WDL WDL        Skin Circulation/Temperature WDL    Skin Circulation/Temperature WDL WDL        Cardiac WDL    Cardiac WDL WDL        Peripheral/Neurovascular WDL    Peripheral Neurovascular WDL WDL        Cognitive/Neuro/Behavioral WDL    Cognitive/Neuro/Behavioral WDL WDL        Saline Coma Scale    Best Eye Response 4-->(E4) spontaneous     Best Motor Response 6-->(M6) obeys commands     Best Verbal Response 5-->(V5) oriented     Lex Coma Scale Score 15

## 2025-06-08 NOTE — ED PROVIDER NOTES
[place] EMERGENCY DEPARTMENT ([facility])    6/07/25       ED PROVIDER NOTE   History     Chief Complaint   Patient presents with    Hip Pain     The history is provided by the patient and medical records.     Olena Gilmore is a 20 year old female with a history of Raynaud's disease, hypermobility syndrome, pelvic floor dysfunction, and primary ovarian failure who presents to the ED for right hip pain. Patient states she woke up today with pain in her upper right hip.  She says it most likely occurred due to her movement in sleeping.  She notes dislocating her right hip in the past. Patient notes having physical therapy in the past for her hypermobility issues with mild benefits and results. Patient reports taking naproxen for her pain with no relief of symptoms and had associated vomiting. Patient estimates her last menstrual cycle was under a month ago. Patient is able to stand up with mild discomfort in right hip with movement. More pain with exertion of movement. Patient denies bowel and urinary symptoms. She denies recent falls. She denies heavy lifting. She denies rash or skin redness on right hip. She denies lower back and lower abdominal pain.  No recent fevers.      Physical Exam   BP: (!) 145/84  Pulse: 64  Temp: 98  F (36.7  C)  Resp: 18  Height: 182.9 cm (6')  Weight: 111.1 kg (245 lb)  SpO2: 98 %  Physical Exam  General: patient is alert and oriented and in no acute distress   Head: atraumatic and normocephalic   EENT: moist mucus membranes without tonsillar erythema or exudates, pupils round and reactive   Neck: supple   Cardiovascular: regular rate and rhythm, extremities warm and well perfused, no lower extremity edema, 2+ PT pulses bilaterally  Pulmonary: lungs clear to auscultation bilaterally   Abdomen: soft, non-tender, distended, no CVA tenderness  Musculoskeletal: normal range of motion of the right hip with intact flexion, extension, internal and external rotation, slight  tenderness along the right iliac crest  Neurological: alert and oriented, moving all extremities symmetrically, gait normal, strength is 5 out of 5 and symmetric with hip flexion and extension, knee flexion and extension and ankle plantar dorsiflexion, sensation to light touch in the bilateral lower extremities is intact  Skin: warm, dry, no skin changes noted    ED Course, Procedures, & Data      Procedures            No results found for any visits on 06/07/25.  Medications - No data to display  Labs Ordered and Resulted from Time of ED Arrival to Time of ED Departure - No data to display  No orders to display          Critical care was not performed.     Medical Decision Making  The patient's presentation was of moderate complexity (an undiagnosed new problem with uncertain prognosis).    The patient's evaluation involved:  ordering and/or review of 3+ test(s) in this encounter (see separate area of note for details)  independent interpretation of testing performed by another health professional (hip XR)    The patient's management necessitated moderate risk (prescription drug management including medications given in the ED).    Assessment & Plan    Ms Gilmore is a 20 year old female with a history of Raynaud's disease, hypermobility syndrome, pelvic floor dysfunction, and primary ovarian failure who presents to the ED for right hip pain.  She is hemodynamically stable, afebrile and in no respiratory distress.  On exam she is neurovascularly intact.  X-ray was obtained which shows no evidence of subluxation or occult fracture.  She does have full range of motion of the hip and has not noted any fevers.  Low suspicion for septic arthritis.  Her abdomen is benign on exam and has no leukocytosis.  At this point have a low suspicion for appendicitis or intra-abdominal infection.  UA is negative for evidence of UTI.  hCG is negative.  She was given [place] [employer] in the ED.  On reassessment is feeling alright and able to  ambulate independently.  Will plan to discharge with close outpatient follow-up and return precautions.    I have reviewed the nursing notes. I have reviewed the findings, diagnosis, plan and need for follow up with the patient.    Discharge Medication List as of 6/7/2025 11:45 PM          Final diagnoses:   Hip pain, right   I, Mendoza Arron Her, am serving as a trained medical scribe to document services personally performed by Ami Johnson MD, based on the provider's statements to me.     I, Ami Johnson MD, was physically present and have reviewed and verified the accuracy of this note documented by Mendoza Greene.     Ami Johnson MD  [facility] EMERGENCY DEPARTMENT  6/7/2025     Ami Johnson MD  06/08/25 0118

## 2025-06-16 PROBLEM — K59.00 CONSTIPATION: Status: ACTIVE | Noted: 2025-06-16

## 2025-06-24 ENCOUNTER — THERAPY VISIT (OUTPATIENT)
Dept: PHYSICAL THERAPY | Facility: CLINIC | Age: 20
End: 2025-06-24
Payer: COMMERCIAL

## 2025-06-24 ENCOUNTER — TELEPHONE (OUTPATIENT)
Dept: FAMILY MEDICINE | Facility: CLINIC | Age: 20
End: 2025-06-24

## 2025-06-24 DIAGNOSIS — M25.50 MULTIPLE JOINT PAIN: ICD-10-CM

## 2025-06-24 DIAGNOSIS — M35.7 HYPERMOBILITY SYNDROME: Primary | ICD-10-CM

## 2025-06-24 PROCEDURE — 97530 THERAPEUTIC ACTIVITIES: CPT | Mod: GP | Performed by: PHYSICAL THERAPIST

## 2025-06-24 PROCEDURE — 97110 THERAPEUTIC EXERCISES: CPT | Mod: GP | Performed by: PHYSICAL THERAPIST

## 2025-06-24 NOTE — TELEPHONE ENCOUNTER
Attempted to reach patient to: Schedule an appointment    When patient returns call, please take this action: Assist with scheduling    Reason for the visit: EDS/Hypermobility Eval. Visit.    When to schedule: Next available    Additional comments/info: If patient calls back please transfer to TC line.

## 2025-06-25 ENCOUNTER — THERAPY VISIT (OUTPATIENT)
Dept: OCCUPATIONAL THERAPY | Facility: CLINIC | Age: 20
End: 2025-06-25
Payer: COMMERCIAL

## 2025-06-25 DIAGNOSIS — M35.7 HYPERMOBILITY SYNDROME: Primary | ICD-10-CM

## 2025-06-25 DIAGNOSIS — Z74.09 IMPAIRED MOBILITY: ICD-10-CM

## 2025-06-25 PROCEDURE — 97530 THERAPEUTIC ACTIVITIES: CPT | Mod: GO

## 2025-06-25 PROCEDURE — 97165 OT EVAL LOW COMPLEX 30 MIN: CPT | Mod: GO

## 2025-06-25 PROCEDURE — 97110 THERAPEUTIC EXERCISES: CPT | Mod: GO

## 2025-06-25 NOTE — PROGRESS NOTES
OCCUPATIONAL THERAPY EVALUATION  Type of Visit: Evaluation       Fall Risk Screen:  Have you fallen 2 or more times in the past year?: No  Have you fallen and had an injury in the past year?: No  Is patient receiving Physical Therapy Services?: No    Subjective        Presenting condition or subjective complaint:  Hypermobility of both hands  Date of onset: 11/01/24    Past Medical History:   Diagnosis Date    ADHD (attention deficit hyperactivity disorder)     GERD (gastroesophageal reflux disease)     hypermobility     Obesity, pediatric, BMI 85th to less than 95th percentile for age 09/18/2017    Premature ovarian failure      Past Surgical History:   Procedure Laterality Date    ADENOIDECTOMY  8/8/68     Prior diagnostic imaging/testing results:     None  Prior therapy history for the same diagnosis, illness or injury:    PT for other joints - knees, first time with treating hands    Prior Level of Function  Transfers: Independent  Ambulation: Independent  ADL: Independent  IADL: Independent    Living Environment  Social support: With family members   Type of home: House; 2-story   Stairs to enter the home: Yes 2 Is there a railing: No     Ramp: No   Stairs inside the home: Yes 12 Is there a railing: Yes     Help at home:  None listed  Equipment owned:   None listed    Employment: Yes Childcare worker  Hobbies/Interests: Gym    Patient goals for therapy:  Stabilizing joints in hands    Pain assessment: Pain present  See objective evaluation for additional pain details     Objective   ADDITIONAL HISTORY:  Right hand dominant  Patient reports symptoms of pain and weakness/loss of strength  Transportation: drives  Currently working in normal job without restrictions    Functional Outcome Measure:   Upper Extremity Functional Index Score:  SCORE:   Column Totals: /80: 69   (A lower score indicates greater disability.)    PAIN:  Pain Level at Rest: 0/10  Pain Level with Use: 6/10  Pain Location: B ulnar wrists, MP  joints of all fingers  Pain Quality: Aching and Dull  Pain Frequency: intermittent  Pain is Worst: daytime  Pain is Exacerbated By: lifting, grabbing, children at work pulling on hands/fingers  Pain is Relieved By: rest  Pain Progression: Unchanged    EDEMA: None - Moderate edema present in L SF    SENSATION: WNL throughout all nerve distributions; per patient report     ROM:   NOTE: Wrist ROM is WNL all planes. Noted below is pain with end range  Wrist ROM  Left AROM Right AROM    Extension tightness -   Flexion - -   Radial Deviation (RD) unstable feeling -   Ulnar Deviation (UD) tightness tightness   Supination tightness -   Pronation - -     NOTE: Thumb ROM is WNL all planes. Noted below is pain with end range  Thumb ROM  Left AROM Right AROM    MP Joint tightness tightness   IP Joint  tightness tightness   Radial Abduction tightness -   Palmar Abduction - -   Retropulsion (cm) - -   K[place]andji Opposition Scale (0-10/10) 2/10 2/10     ROM: Fingers: Noted to be  WNL - tightness reported with left fist  Comments on Deviation or hyperextension noted: MCP: - PIP: L IF UD 5, L LF UD 5; DIP: L IF UD 10, L LF UD 10   Pt comments on stability of fingers: fingers feel unable at the MCPs    Hyperextension L PIP Joint L DIP Joint R PIP Joint R DIP Joint   Index + ++ + ++   Middle + ++ ++ ++   Ring + ++ + ++   Small + ++ + ++   Thumb IP Joint  +  +   Thumb MP Joint ++  ++      STRENGTH:     Measured in pounds 6/25/2025 6/25/2025    Left Right   Trial 1 55 68     Lateral Pinch  Measured in pounds 6/25/2025 6/25/2025    Left Right   Trial 1 14 17     3 Point Pinch  Measured in pounds 6/25/2025 6/25/2025    Left Right   Trial 1 12 12     Assessment & Plan   CLINICAL IMPRESSIONS  Medical Diagnosis: Hypermobility syndrome & Impaired mobility    Treatment Diagnosis: Hypermobility syndrome & Impaired mobility    Impression/Assessment: Pt is a 20 year old female presenting to Occupational Therapy due to hypermobility syndrome &  impaired mobility.  The following significant findings have been identified: Impaired ROM, Impaired strength, and Pain.  These identified deficits interfere with their ability to perform self care tasks, work tasks, household chores, driving ,  yard work, and meal planning and preparation as compared to previous level of function.   Patient's limitations or Problem List includes: Pain, Increased edema, Weakness, Decreased stability, Hypermobility, Decreased , Decreased pinch, Decreased coordination, Decreased dexterity, Tightness in musculature, and Adherence in connective tissue of the bilateral hand which interferes with the patient's ability to perform Self Care Tasks (dressing, hygiene/toileting), Work Tasks, Household Chores, and Driving  as compared to previous level of function.    Clinical Decision Making (Complexity):  Assessment of Occupational Performance: 5 or more Performance Deficits  Occupational Performance Limitations: dressing, hygiene and grooming, driving and community mobility, health management and maintenance, home establishment and management, meal preparation and cleanup, and shopping  Clinical Decision Making (Complexity): Low complexity    PLAN OF CARE  Treatment Interventions:  Modalities:  US and Paraffin  Therapeutic Exercise:  AROM, AAROM, PROM, Tendon Gliding, Blocking, Reverse Blocking, Place and Hold, Contract Relax, Extensor Tracking, Isotonics, Isometrics, and Stabilization  Neuromuscular re-education:  Nerve Gliding, Sensory re-education, Desensitization, Kinesthetic Training, Proprioceptive Training, Posture, Kinesiotaping, Isometrics, and Stabilization  Manual Techniques:  Coordination/Dexterity, Joint mobilization, Friction massage, Myofascial release, and Manual edema mobilization  Orthotic Fabrication:  Static, Finger based, Hand based, Forearm based, and Long arm  Self Care:  Self Care Tasks, Ergonomic Considerations, and Work Tasks    Long Term Goals   OT Goal 1  Goal  Identifier: Weightbearing  Goal Description: Pt will report no difficulty with pushing through the hand in order to transfer out of a chair  Rationale: In order to maximize safety and independence with ADL/IADLs  Goal Progress: Moderate difficulty  Target Date: 09/25/25      Frequency of Treatment: 2x monthly  Duration of Treatment: 3 months     Education Assessment: Learner/Method: Patient;Demonstration;Pictures/Video  Education Comments: PTRX via phone     Risks and benefits of evaluation/treatment have been explained.   Patient/Family/caregiver agrees with Plan of Care.     Evaluation Time:    OT Eval, Low Complexity Minutes (37334): 35    Signing Clinician: MICHI Marcial [facility]                                                                                   OUTPATIENT OCCUPATIONAL THERAPY      PLAN OF TREATMENT FOR OUTPATIENT REHABILITATION   Patient's Last Name, First Name, MONICAWHIT  DeirdreOlena FRANCISCO YOB: 2005   Provider's Name   [facility]   Medical Record No.  [id number]     Onset Date: 11/01/24 Start of Care Date: 06/25/25     Medical Diagnosis:  Hypermobility syndrome & Impaired mobility      OT Treatment Diagnosis:  Hypermobility syndrome & Impaired mobility Plan of Treatment  Frequency/Duration:2x monthly/3 months    Certification date from 06/25/25   To 09/22/25        See note for plan of treatment details and functional goals     Katt Funk OT                         I CERTIFY THE NEED FOR THESE SERVICES FURNISHED UNDER        THIS PLAN OF TREATMENT AND WHILE UNDER MY CARE     (Physician attestation of this document indicates review and certification of the therapy plan).              Referring Provider:  Lyndsey Meyer    Initial Assessment  See Epic Evaluation- 06/25/25

## 2025-07-03 ENCOUNTER — MYC MEDICAL ADVICE (OUTPATIENT)
Dept: FAMILY MEDICINE | Facility: CLINIC | Age: 20
End: 2025-07-03

## 2025-07-03 ENCOUNTER — THERAPY VISIT (OUTPATIENT)
Dept: OCCUPATIONAL THERAPY | Facility: CLINIC | Age: 20
End: 2025-07-03
Payer: COMMERCIAL

## 2025-07-03 DIAGNOSIS — Z74.09 IMPAIRED MOBILITY: ICD-10-CM

## 2025-07-03 DIAGNOSIS — M35.7 HYPERMOBILITY SYNDROME: Primary | ICD-10-CM

## 2025-07-03 NOTE — TELEPHONE ENCOUNTER
See MyChart encounter below. Routing to provider to review and advise.      Please advise if able to place new OT referral to include silver finger splints, thank you!      Mela Donnelly RN, BSN  [facility]  [place], [place] and [place]

## 2025-07-09 ENCOUNTER — MYC MEDICAL ADVICE (OUTPATIENT)
Dept: CONSULT | Facility: CLINIC | Age: 20
End: 2025-07-09
Payer: COMMERCIAL

## 2025-07-15 ENCOUNTER — THERAPY VISIT (OUTPATIENT)
Dept: PHYSICAL THERAPY | Facility: CLINIC | Age: 20
End: 2025-07-15
Payer: COMMERCIAL

## 2025-07-15 DIAGNOSIS — M35.7 HYPERMOBILITY SYNDROME: Primary | ICD-10-CM

## 2025-07-15 DIAGNOSIS — M25.50 MULTIPLE JOINT PAIN: ICD-10-CM

## 2025-07-15 PROCEDURE — 97140 MANUAL THERAPY 1/> REGIONS: CPT | Mod: GP | Performed by: PHYSICAL THERAPIST

## 2025-07-15 PROCEDURE — 97110 THERAPEUTIC EXERCISES: CPT | Mod: GP | Performed by: PHYSICAL THERAPIST

## 2025-07-22 ENCOUNTER — THERAPY VISIT (OUTPATIENT)
Dept: PHYSICAL THERAPY | Facility: CLINIC | Age: 20
End: 2025-07-22
Payer: COMMERCIAL

## 2025-07-22 DIAGNOSIS — M35.7 HYPERMOBILITY SYNDROME: Primary | ICD-10-CM

## 2025-07-22 DIAGNOSIS — M25.50 MULTIPLE JOINT PAIN: ICD-10-CM

## 2025-07-22 PROCEDURE — 97110 THERAPEUTIC EXERCISES: CPT | Mod: GP | Performed by: PHYSICAL THERAPIST

## 2025-07-24 ENCOUNTER — THERAPY VISIT (OUTPATIENT)
Dept: OCCUPATIONAL THERAPY | Facility: CLINIC | Age: 20
End: 2025-07-24
Payer: COMMERCIAL

## 2025-07-24 DIAGNOSIS — M35.7 HYPERMOBILITY SYNDROME: Primary | ICD-10-CM

## 2025-07-24 DIAGNOSIS — Z74.09 IMPAIRED MOBILITY: ICD-10-CM

## 2025-07-29 ENCOUNTER — NURSE TRIAGE (OUTPATIENT)
Dept: NURSING | Facility: CLINIC | Age: 20
End: 2025-07-29

## 2025-07-30 NOTE — TELEPHONE ENCOUNTER
"Hypermobile. Existing condition with it. Shoulder issues tonight. Right shoulder.  She said she will head into the ER tonight. Pain at 7 and hasn't used anything for pain yet.    Ashley Jalloh RN  [facility]    Reason for Disposition   Weakness (i.e., loss of strength) in hand or fingers  (Exception: Not truly weak; hand feels weak because of pain.)    Additional Information   Negative: Passed out (i.e., lost consciousness, collapsed and was not responding)   Negative: Shock suspected (e.g., cold/pale/clammy skin, too weak to stand, low BP, rapid pulse)   Negative: [1] Similar pain previously AND [2] it was from \"heart attack\"   Negative: [1] Similar pain previously AND [2] it was from \"angina\" AND [3] not relieved by nitroglycerin   Negative: Sounds like a life-threatening emergency to the triager   Negative: Followed a shoulder injury   Negative: Chest pain   Negative: Difficulty breathing or unusual sweating (e.g., sweating without exertion)   Negative: [1] Pain lasting > 5 minutes AND [2] pain also present in chest  (Exception: Pain is clearly made worse by movement.)   Negative: [1] Age > 40 AND [2] no obvious cause AND [3] pain even when not moving the arm  (Exception: Pain is clearly made worse by moving arm or bending neck.)   Negative: [1] SEVERE pain AND [2] not improved 2 hours after pain medicine     Pain at 7. Hasn't used anything yet.   Negative: [1] Red area or streak AND [2] fever   Negative: [1] Swollen joint AND [2] fever   Negative: Patient sounds very sick or weak to the triager   Negative: Entire arm is swollen    Protocols used: Shoulder Pain-A-AH    "

## 2025-08-04 ENCOUNTER — OFFICE VISIT (OUTPATIENT)
Dept: URGENT CARE | Facility: URGENT CARE | Age: 20
End: 2025-08-04
Payer: COMMERCIAL

## 2025-08-04 ENCOUNTER — ANCILLARY PROCEDURE (OUTPATIENT)
Dept: GENERAL RADIOLOGY | Facility: CLINIC | Age: 20
End: 2025-08-04
Attending: EMERGENCY MEDICINE
Payer: COMMERCIAL

## 2025-08-04 VITALS
RESPIRATION RATE: 18 BRPM | BODY MASS INDEX: 34.3 KG/M2 | DIASTOLIC BLOOD PRESSURE: 84 MMHG | SYSTOLIC BLOOD PRESSURE: 127 MMHG | HEART RATE: 86 BPM | TEMPERATURE: 98.6 F | HEIGHT: 71 IN | OXYGEN SATURATION: 94 % | WEIGHT: 245 LBS

## 2025-08-04 DIAGNOSIS — J06.9 UPPER RESPIRATORY TRACT INFECTION, UNSPECIFIED TYPE: ICD-10-CM

## 2025-08-04 DIAGNOSIS — J03.90 TONSILLITIS: Primary | ICD-10-CM

## 2025-08-04 DIAGNOSIS — R13.10 ODYNOPHAGIA: ICD-10-CM

## 2025-08-04 LAB
DEPRECATED S PYO AG THROAT QL EIA: NEGATIVE
S PYO DNA THROAT QL NAA+PROBE: NOT DETECTED

## 2025-08-04 PROCEDURE — 71046 X-RAY EXAM CHEST 2 VIEWS: CPT | Mod: TC | Performed by: RADIOLOGY

## 2025-08-04 PROCEDURE — 99214 OFFICE O/P EST MOD 30 MIN: CPT | Performed by: EMERGENCY MEDICINE

## 2025-08-04 PROCEDURE — 87651 STREP A DNA AMP PROBE: CPT | Performed by: EMERGENCY MEDICINE

## 2025-08-04 RX ORDER — DEXAMETHASONE SODIUM PHOSPHATE 10 MG/ML
6 INJECTION INTRAMUSCULAR; INTRAVENOUS ONCE
Status: COMPLETED | OUTPATIENT
Start: 2025-08-04 | End: 2025-08-04

## 2025-08-04 RX ORDER — BENZONATATE 100 MG/1
100 CAPSULE ORAL 3 TIMES DAILY PRN
Qty: 21 CAPSULE | Refills: 0 | Status: SHIPPED | OUTPATIENT
Start: 2025-08-04

## 2025-08-04 RX ADMIN — DEXAMETHASONE SODIUM PHOSPHATE 6 MG: 10 INJECTION INTRAMUSCULAR; INTRAVENOUS at 18:23

## 2025-08-04 ASSESSMENT — PAIN SCALES - GENERAL: PAINLEVEL_OUTOF10: MODERATE PAIN (6)

## 2025-08-05 ENCOUNTER — THERAPY VISIT (OUTPATIENT)
Dept: PHYSICAL THERAPY | Facility: CLINIC | Age: 20
End: 2025-08-05
Payer: COMMERCIAL

## 2025-08-05 DIAGNOSIS — M35.7 HYPERMOBILITY SYNDROME: Primary | ICD-10-CM

## 2025-08-05 DIAGNOSIS — M25.50 MULTIPLE JOINT PAIN: ICD-10-CM

## 2025-08-05 PROCEDURE — 97110 THERAPEUTIC EXERCISES: CPT | Mod: GP | Performed by: PHYSICAL THERAPIST

## 2025-08-05 PROCEDURE — 97530 THERAPEUTIC ACTIVITIES: CPT | Mod: GP | Performed by: PHYSICAL THERAPIST

## 2025-08-05 PROCEDURE — 97140 MANUAL THERAPY 1/> REGIONS: CPT | Mod: GP | Performed by: PHYSICAL THERAPIST

## 2025-08-14 ENCOUNTER — THERAPY VISIT (OUTPATIENT)
Dept: OCCUPATIONAL THERAPY | Facility: CLINIC | Age: 20
End: 2025-08-14
Payer: COMMERCIAL

## 2025-08-14 DIAGNOSIS — M35.7 HYPERMOBILITY SYNDROME: Primary | ICD-10-CM

## 2025-08-14 DIAGNOSIS — Z74.09 IMPAIRED MOBILITY: ICD-10-CM

## 2025-08-14 DIAGNOSIS — F90.0 ATTENTION DEFICIT HYPERACTIVITY DISORDER, INATTENTIVE TYPE: ICD-10-CM

## 2025-08-14 RX ORDER — DEXMETHYLPHENIDATE HYDROCHLORIDE 15 MG/1
15 CAPSULE, EXTENDED RELEASE ORAL DAILY
Qty: 30 CAPSULE | Refills: 0 | Status: SHIPPED | OUTPATIENT
Start: 2025-08-14

## 2025-08-19 ENCOUNTER — THERAPY VISIT (OUTPATIENT)
Dept: PHYSICAL THERAPY | Facility: CLINIC | Age: 20
End: 2025-08-19
Payer: COMMERCIAL

## 2025-08-19 DIAGNOSIS — M25.50 MULTIPLE JOINT PAIN: ICD-10-CM

## 2025-08-19 DIAGNOSIS — M35.7 HYPERMOBILITY SYNDROME: Primary | ICD-10-CM

## 2025-08-19 PROCEDURE — 97140 MANUAL THERAPY 1/> REGIONS: CPT | Mod: GP | Performed by: PHYSICAL THERAPIST

## 2025-08-19 PROCEDURE — 97110 THERAPEUTIC EXERCISES: CPT | Mod: GP | Performed by: PHYSICAL THERAPIST
